# Patient Record
Sex: MALE | Race: WHITE | NOT HISPANIC OR LATINO | Employment: FULL TIME | URBAN - METROPOLITAN AREA
[De-identification: names, ages, dates, MRNs, and addresses within clinical notes are randomized per-mention and may not be internally consistent; named-entity substitution may affect disease eponyms.]

---

## 2018-07-17 ENCOUNTER — OFFICE VISIT (OUTPATIENT)
Dept: CARDIOLOGY | Facility: CLINIC | Age: 48
End: 2018-07-17

## 2018-07-17 VITALS
WEIGHT: 259.8 LBS | BODY MASS INDEX: 38.48 KG/M2 | HEIGHT: 69 IN | SYSTOLIC BLOOD PRESSURE: 130 MMHG | DIASTOLIC BLOOD PRESSURE: 80 MMHG | HEART RATE: 88 BPM

## 2018-07-17 DIAGNOSIS — R06.02 SHORTNESS OF BREATH: Primary | ICD-10-CM

## 2018-07-17 DIAGNOSIS — E11.9 TYPE 2 DIABETES MELLITUS WITHOUT COMPLICATION, WITHOUT LONG-TERM CURRENT USE OF INSULIN (HCC): ICD-10-CM

## 2018-07-17 DIAGNOSIS — R61 DIAPHORESIS: ICD-10-CM

## 2018-07-17 PROCEDURE — 93000 ELECTROCARDIOGRAM COMPLETE: CPT | Performed by: INTERNAL MEDICINE

## 2018-07-17 PROCEDURE — 99204 OFFICE O/P NEW MOD 45 MIN: CPT | Performed by: INTERNAL MEDICINE

## 2018-07-18 NOTE — PROGRESS NOTES
Date of Office Visit: 2018  Encounter Provider: Joel Dalton MD  Place of Service: [facility] CARDIOLOGY  Patient Name: Jose Almazan  :1970    Chief complaint: Shortness of breath, diaphoresis.    History of Present Illness:    Dear Dr. Alarcon:    I had the pleasure of seeing your patient in cardiology office on 2018. As you well   know, he is a very pleasant 48 year-old white male with a history of morbid obesity   and diabetes who presents for follow-up.  I originally saw the patient in  for   shortness of breath.  His work-up at that time showed no significant issues.  He   actually did undergo a cardiac catheterization which I do not have the results of   currently, although he had very mild nonobstructive disease per report.    The patient presents today for evaluation.  He states that he has been more   diaphoretic, even with minimal activity recently.  He also has been having more   shortness of breath with exertion, although he is unclear if this is from his asthma.    He is concerning given his long-standing diabetes, as well as his family history of   coronary artery disease.  His father had a four-vessel CABG in his late 40s, and his   mother also just recently is  from an MI, complicated by a perforated   coronary artery during a heart catheterization.  Paternal grandfather also had 3   myocardial infarctions.  He has not had any chest discomfort.  His EKG from today   is unremarkable.    Past Medical History:   Diagnosis Date   • Asthma    • Diabetes mellitus (CMS/HCC)    • GERD (gastroesophageal reflux disease)    • Hyperlipidemia    • Hypertension    • Kidney stones    • Morbid obesity (CMS/HCC)    • GURDEEP on CPAP        Past Surgical History:   Procedure Laterality Date   • CHOLECYSTECTOMY     • TONSILLECTOMY         Current Outpatient Prescriptions on File Prior to Visit   Medication Sig Dispense Refill   • aspirin 325 MG  tablet Take 325 mg by mouth.     • dapagliflozin (FARXIGA) 5 MG tablet tablet Take 5 mg by mouth.     • glimepiride (AMARYL) 4 MG tablet Take 4 mg by mouth.     • glucose blood test strip 1 strip by Other route.     • irbesartan (AVAPRO) 150 MG tablet Take  by mouth.     • Lancets Misc. misc Inject 1 each under the skin.     • Liraglutide (VICTOZA) 18 MG/3ML solution pen-injector injection Inject 1.8 mg under the skin.     • metFORMIN ER (GLUCOPHAGE-XR) 500 MG 24 hr tablet Take 1,500 mg by mouth.     • metroNIDAZOLE (METROGEL) 0.75 % gel Apply  topically.     • omeprazole (priLOSEC) 40 MG capsule Take 40 mg by mouth.     • pioglitazone-metFORMIN (ACTOPLUS MET)  MG per tablet      • ROSUVASTATIN CALCIUM PO Take 10 mg by mouth.       No current facility-administered medications on file prior to visit.      Allergies as of 2018   • (No Known Allergies)     Social History     Social History   • Marital status:      Spouse name: N/A   • Number of children: N/A   • Years of education: N/A     Occupational History   • Not on file.     Social History Main Topics   • Smoking status: Never Smoker   • Smokeless tobacco: Never Used   • Alcohol use Yes      Comment: Socially.  3-4 beers per week on average.   • Drug use: No   • Sexual activity: Not on file     Other Topics Concern   • Not on file     Social History Narrative   • No narrative on file     Family History   Problem Relation Age of Onset   • Heart disease Mother         Mother  from MI and perforated coronary artery during cath   • Heart disease Father         Father with 4 vessel CABG in his late 40's.  Also with 2 MI's.   • Diabetes Father    • Down syndrome Sister    • Coronary artery disease Paternal Grandfather         Paternal GF with 3 MI's       Review of Systems   Constitution: Positive for diaphoresis.   Cardiovascular: Positive for dyspnea on exertion.   All other systems reviewed and are negative.     Objective:     Vitals:     "07/17/18 0926   BP: 130/80   Pulse: 88   Weight: 118 kg (259 lb 12.8 oz)   Height: 175.3 cm (69\")     Body mass index is 38.37 kg/m².    Physical Exam   Constitutional: He is oriented to person, place, and time.   Obese   HENT:   Head: Normocephalic and atraumatic.   Eyes: Conjunctivae are normal.   Neck: Neck supple.   Cardiovascular: Normal rate and regular rhythm.  Exam reveals no gallop and no friction rub.    No murmur heard.  Pulmonary/Chest: Effort normal and breath sounds normal.   Abdominal: Soft. There is no tenderness.   Musculoskeletal: He exhibits no edema.   Neurological: He is alert and oriented to person, place, and time.   Skin: Skin is warm.   Psychiatric: He has a normal mood and affect. His behavior is normal.     Lab Review:     ECG 12 Lead  Date/Time: 7/17/2018 3:28 PM  Performed by: ORLY BOWDEN  Authorized by: ORLY BOWDEN   Comparison: compared with previous ECG from 8/28/2016  Rhythm: sinus rhythm  Rate: normal  BPM: 88  Clinical impression: normal ECG          Assessment:       Diagnosis Plan   1. Shortness of breath  Adult Stress Echo W/ Cont or Stress Agent if Necessary Per Protocol   2. Type 2 diabetes mellitus without complication, without long-term current use of insulin (CMS/[facility])  Adult Stress Echo W/ Cont or Stress Agent if Necessary Per Protocol   3. Diaphoresis  Adult Stress Echo W/ Cont or Stress Agent if Necessary Per Protocol     Plan:       The patient does have a history of long-standing diabetes, as well as an extensive family   history of coronary artery disease.  I do not have his heart catheterization from 2011 at   [facility], although there was reportedly mild nonobstructive disease at that time.    Obviously, thinks could have changed since that point.  He has been more short of breath   and diaphoretic with exertion.  I have recommended proceeding with an exercise stress   echocardiogram at this point to evaluate for any potential structural " disease, as well as   for potential ischemia.  He is already taking aspirin and Crestor, and I advised him that   these are the medications that will help prevent MI and death in the future if he were to   have any significant coronary disease.  Further plans will be made pending the results of   the stress echocardiogram.

## 2018-07-23 ENCOUNTER — APPOINTMENT (OUTPATIENT)
Dept: CARDIOLOGY | Facility: HOSPITAL | Age: 48
End: 2018-07-23
Attending: INTERNAL MEDICINE

## 2018-08-07 ENCOUNTER — HOSPITAL ENCOUNTER (OUTPATIENT)
Dept: CARDIOLOGY | Facility: HOSPITAL | Age: 48
Discharge: HOME | End: 2018-08-07
Attending: INTERNAL MEDICINE | Admitting: INTERNAL MEDICINE

## 2018-08-07 VITALS
DIASTOLIC BLOOD PRESSURE: 90 MMHG | SYSTOLIC BLOOD PRESSURE: 132 MMHG | WEIGHT: 259 LBS | HEIGHT: 69 IN | BODY MASS INDEX: 38.36 KG/M2 | HEART RATE: 93 BPM

## 2018-08-07 DIAGNOSIS — E11.9 TYPE 2 DIABETES MELLITUS WITHOUT COMPLICATION, WITHOUT LONG-TERM CURRENT USE OF INSULIN (HCC): ICD-10-CM

## 2018-08-07 DIAGNOSIS — R61 DIAPHORESIS: ICD-10-CM

## 2018-08-07 DIAGNOSIS — R06.02 SHORTNESS OF BREATH: ICD-10-CM

## 2018-08-07 LAB
BH CV ECHO MEAS - ACS: 2 CM
BH CV ECHO MEAS - AO MAX PG: 4.3 MMHG
BH CV ECHO MEAS - AO ROOT AREA (BSA CORRECTED): 1.4
BH CV ECHO MEAS - AO ROOT AREA: 7.9 CM^2
BH CV ECHO MEAS - AO ROOT DIAM: 3.2 CM
BH CV ECHO MEAS - AO V2 MAX: 103.9 CM/SEC
BH CV ECHO MEAS - BSA(HAYCOCK): 2.4 M^2
BH CV ECHO MEAS - BSA: 2.3 M^2
BH CV ECHO MEAS - BZI_BMI: 38.2 KILOGRAMS/M^2
BH CV ECHO MEAS - BZI_METRIC_HEIGHT: 175.3 CM
BH CV ECHO MEAS - BZI_METRIC_WEIGHT: 117.5 KG
BH CV ECHO MEAS - CONTRAST EF (2CH): 60 ML/M^2
BH CV ECHO MEAS - CONTRAST EF 4CH: 69.7 ML/M^2
BH CV ECHO MEAS - EDV(MOD-SP2): 85 ML
BH CV ECHO MEAS - EDV(MOD-SP4): 76 ML
BH CV ECHO MEAS - EDV(TEICH): 143.7 ML
BH CV ECHO MEAS - EF(CUBED): 68.8 %
BH CV ECHO MEAS - EF(MOD-BP): 61 %
BH CV ECHO MEAS - EF(MOD-SP2): 60 %
BH CV ECHO MEAS - EF(MOD-SP4): 69.7 %
BH CV ECHO MEAS - EF(TEICH): 59.9 %
BH CV ECHO MEAS - ESV(MOD-SP2): 34 ML
BH CV ECHO MEAS - ESV(MOD-SP4): 23 ML
BH CV ECHO MEAS - ESV(TEICH): 57.7 ML
BH CV ECHO MEAS - FS: 32.2 %
BH CV ECHO MEAS - IVS/LVPW: 1
BH CV ECHO MEAS - IVSD: 1.2 CM
BH CV ECHO MEAS - LAT PEAK E' VEL: 9 CM/SEC
BH CV ECHO MEAS - LV DIASTOLIC VOL/BSA (35-75): 33 ML/M^2
BH CV ECHO MEAS - LV MASS(C)D: 246.2 GRAMS
BH CV ECHO MEAS - LV MASS(C)DI: 106.8 GRAMS/M^2
BH CV ECHO MEAS - LV SYSTOLIC VOL/BSA (12-30): 10 ML/M^2
BH CV ECHO MEAS - LVIDD: 5.4 CM
BH CV ECHO MEAS - LVIDS: 3.7 CM
BH CV ECHO MEAS - LVLD AP2: 8.2 CM
BH CV ECHO MEAS - LVLD AP4: 7.5 CM
BH CV ECHO MEAS - LVLS AP2: 7.6 CM
BH CV ECHO MEAS - LVLS AP4: 5.8 CM
BH CV ECHO MEAS - LVPWD: 1.1 CM
BH CV ECHO MEAS - MED PEAK E' VEL: 6 CM/SEC
BH CV ECHO MEAS - MV A DUR: 0.11 SEC
BH CV ECHO MEAS - MV A MAX VEL: 64.8 CM/SEC
BH CV ECHO MEAS - MV DEC SLOPE: 185.8 CM/SEC^2
BH CV ECHO MEAS - MV DEC TIME: 0.23 SEC
BH CV ECHO MEAS - MV E MAX VEL: 45.6 CM/SEC
BH CV ECHO MEAS - MV E/A: 0.7
BH CV ECHO MEAS - MV P1/2T MAX VEL: 47 CM/SEC
BH CV ECHO MEAS - MV P1/2T: 74 MSEC
BH CV ECHO MEAS - MVA P1/2T LCG: 4.7 CM^2
BH CV ECHO MEAS - MVA(P1/2T): 3 CM^2
BH CV ECHO MEAS - PULM A REVS DUR: 0.11 SEC
BH CV ECHO MEAS - PULM A REVS VEL: 23 CM/SEC
BH CV ECHO MEAS - PULM DIAS VEL: 27.1 CM/SEC
BH CV ECHO MEAS - PULM S/D: 1.3
BH CV ECHO MEAS - PULM SYS VEL: 36.3 CM/SEC
BH CV ECHO MEAS - SI(CUBED): 48 ML/M^2
BH CV ECHO MEAS - SI(MOD-SP2): 22.1 ML/M^2
BH CV ECHO MEAS - SI(MOD-SP4): 23 ML/M^2
BH CV ECHO MEAS - SI(TEICH): 37.3 ML/M^2
BH CV ECHO MEAS - SV(CUBED): 110.8 ML
BH CV ECHO MEAS - SV(MOD-SP2): 51 ML
BH CV ECHO MEAS - SV(MOD-SP4): 53 ML
BH CV ECHO MEAS - SV(TEICH): 86 ML
BH CV ECHO MEAS - TAPSE (>1.6): 2.5 CM2
BH CV ECHO MEASUREMENTS AVERAGE E/E' RATIO: 6.08
BH CV STRESS BP STAGE 1: NORMAL
BH CV STRESS BP STAGE 2: NORMAL
BH CV STRESS BP STAGE 3: NORMAL
BH CV STRESS DURATION MIN STAGE 1: 3
BH CV STRESS DURATION MIN STAGE 2: 3
BH CV STRESS DURATION MIN STAGE 3: 2
BH CV STRESS DURATION SEC STAGE 1: 0
BH CV STRESS DURATION SEC STAGE 2: 0
BH CV STRESS DURATION SEC STAGE 3: 0
BH CV STRESS ECHO POST STRESS EJECTION FRACTION EF: 69 %
BH CV STRESS GRADE STAGE 1: 10
BH CV STRESS GRADE STAGE 2: 12
BH CV STRESS GRADE STAGE 3: 14
BH CV STRESS HR STAGE 2: 141
BH CV STRESS HR STAGE 3: 156
BH CV STRESS METS STAGE 1: 5
BH CV STRESS METS STAGE 2: 7.5
BH CV STRESS METS STAGE 3: 10
BH CV STRESS PROTOCOL 1: NORMAL
BH CV STRESS SPEED STAGE 1: 1.7
BH CV STRESS SPEED STAGE 2: 2.5
BH CV STRESS SPEED STAGE 3: 3.4
BH CV STRESS STAGE 1: 1
BH CV STRESS STAGE 2: 2
BH CV STRESS STAGE 3: 3
BH CV XLRA - RV BASE: 2.8 CM
BH CV XLRA - TDI S': 10 CM/SEC
LEFT ATRIUM VOLUME INDEX: 39 ML/M2
LV EF 2D ECHO EST: 61 %
PERCENT MAX PREDICTED HR: 91.23 %
STRESS BASELINE BP: NORMAL MMHG
STRESS BASELINE HR: 93 BPM
STRESS PERCENT HR: 107 %
STRESS POST ESTIMATED WORKLOAD: 9 METS
STRESS POST EXERCISE DUR MIN: 8 MIN
STRESS POST EXERCISE DUR SEC: 0 SEC
STRESS POST PEAK BP: NORMAL MMHG
STRESS POST PEAK HR: 156 BPM

## 2018-08-07 PROCEDURE — 93320 DOPPLER ECHO COMPLETE: CPT | Performed by: INTERNAL MEDICINE

## 2018-08-07 PROCEDURE — 93325 DOPPLER ECHO COLOR FLOW MAPG: CPT | Performed by: INTERNAL MEDICINE

## 2018-08-07 PROCEDURE — 93018 CV STRESS TEST I&R ONLY: CPT | Performed by: INTERNAL MEDICINE

## 2018-08-07 PROCEDURE — 93016 CV STRESS TEST SUPVJ ONLY: CPT | Performed by: INTERNAL MEDICINE

## 2018-08-07 PROCEDURE — 93325 DOPPLER ECHO COLOR FLOW MAPG: CPT

## 2018-08-07 PROCEDURE — 93350 STRESS TTE ONLY: CPT

## 2018-08-07 PROCEDURE — 93350 STRESS TTE ONLY: CPT | Performed by: INTERNAL MEDICINE

## 2018-08-07 PROCEDURE — 93017 CV STRESS TEST TRACING ONLY: CPT

## 2018-08-07 PROCEDURE — 93320 DOPPLER ECHO COMPLETE: CPT

## 2019-03-17 ENCOUNTER — HOSPITAL ENCOUNTER (OUTPATIENT)
Facility: HOSPITAL | Age: 49
Discharge: HOME | End: 2019-03-18
Attending: EMERGENCY MEDICINE | Admitting: INTERNAL MEDICINE

## 2019-03-17 ENCOUNTER — APPOINTMENT (OUTPATIENT)
Dept: GENERAL RADIOLOGY | Facility: HOSPITAL | Age: 49
End: 2019-03-17

## 2019-03-17 DIAGNOSIS — R07.9 CHEST PAIN, UNSPECIFIED TYPE: Primary | ICD-10-CM

## 2019-03-17 LAB
ALBUMIN SERPL-MCNC: 4.4 G/DL (ref 3.5–5.2)
ALBUMIN/GLOB SERPL: 1.8 G/DL
ALP SERPL-CCNC: 88 U/L (ref 39–117)
ALT SERPL W P-5'-P-CCNC: 24 U/L (ref 1–41)
ANION GAP SERPL CALCULATED.3IONS-SCNC: 14.7 MMOL/L
AST SERPL-CCNC: 18 U/L (ref 1–40)
BASOPHILS # BLD AUTO: 0.04 10*3/MM3 (ref 0–0.2)
BASOPHILS NFR BLD AUTO: 0.5 % (ref 0–1.5)
BILIRUB SERPL-MCNC: 0.8 MG/DL (ref 0.1–1.2)
BUN BLD-MCNC: 13 MG/DL (ref 6–20)
BUN/CREAT SERPL: 13.7 (ref 7–25)
CALCIUM SPEC-SCNC: 9.4 MG/DL (ref 8.6–10.5)
CHLORIDE SERPL-SCNC: 102 MMOL/L (ref 98–107)
CO2 SERPL-SCNC: 26.3 MMOL/L (ref 22–29)
CREAT BLD-MCNC: 0.95 MG/DL (ref 0.76–1.27)
D DIMER PPP FEU-MCNC: <0.27 MCGFEU/ML (ref 0–0.49)
DEPRECATED RDW RBC AUTO: 39.5 FL (ref 37–54)
EOSINOPHIL # BLD AUTO: 0.07 10*3/MM3 (ref 0–0.4)
EOSINOPHIL NFR BLD AUTO: 0.9 % (ref 0.3–6.2)
ERYTHROCYTE [DISTWIDTH] IN BLOOD BY AUTOMATED COUNT: 11.9 % (ref 12.3–15.4)
GFR SERPL CREATININE-BSD FRML MDRD: 84 ML/MIN/1.73
GLOBULIN UR ELPH-MCNC: 2.5 GM/DL
GLUCOSE BLD-MCNC: 89 MG/DL (ref 65–99)
GLUCOSE BLDC GLUCOMTR-MCNC: 75 MG/DL (ref 70–130)
HCT VFR BLD AUTO: 43.7 % (ref 37.5–51)
HGB BLD-MCNC: 14.5 G/DL (ref 13–17.7)
HOLD SPECIMEN: NORMAL
HOLD SPECIMEN: NORMAL
IMM GRANULOCYTES # BLD AUTO: 0.03 10*3/MM3 (ref 0–0.05)
IMM GRANULOCYTES NFR BLD AUTO: 0.4 % (ref 0–0.5)
LYMPHOCYTES # BLD AUTO: 0.85 10*3/MM3 (ref 0.7–3.1)
LYMPHOCYTES NFR BLD AUTO: 11.3 % (ref 19.6–45.3)
MCH RBC QN AUTO: 30.1 PG (ref 26.6–33)
MCHC RBC AUTO-ENTMCNC: 33.2 G/DL (ref 31.5–35.7)
MCV RBC AUTO: 90.7 FL (ref 79–97)
MONOCYTES # BLD AUTO: 0.57 10*3/MM3 (ref 0.1–0.9)
MONOCYTES NFR BLD AUTO: 7.6 % (ref 5–12)
NEUTROPHILS # BLD AUTO: 5.93 10*3/MM3 (ref 1.4–7)
NEUTROPHILS NFR BLD AUTO: 79.3 % (ref 42.7–76)
NRBC BLD AUTO-RTO: 0 /100 WBC (ref 0–0)
PLATELET # BLD AUTO: 236 10*3/MM3 (ref 140–450)
PMV BLD AUTO: 9.7 FL (ref 6–12)
POTASSIUM BLD-SCNC: 3.8 MMOL/L (ref 3.5–5.2)
PROT SERPL-MCNC: 6.9 G/DL (ref 6–8.5)
RBC # BLD AUTO: 4.82 10*6/MM3 (ref 4.14–5.8)
SODIUM BLD-SCNC: 143 MMOL/L (ref 136–145)
TROPONIN T SERPL-MCNC: <0.01 NG/ML (ref 0–0.03)
TROPONIN T SERPL-MCNC: <0.01 NG/ML (ref 0–0.03)
WBC NRBC COR # BLD: 7.49 10*3/MM3 (ref 3.4–10.8)
WHOLE BLOOD HOLD SPECIMEN: NORMAL
WHOLE BLOOD HOLD SPECIMEN: NORMAL

## 2019-03-17 PROCEDURE — 25010000002 ENOXAPARIN PER 10 MG: Performed by: INTERNAL MEDICINE

## 2019-03-17 PROCEDURE — 93010 ELECTROCARDIOGRAM REPORT: CPT | Performed by: INTERNAL MEDICINE

## 2019-03-17 PROCEDURE — G0378 HOSPITAL OBSERVATION PER HR: HCPCS

## 2019-03-17 PROCEDURE — 84484 ASSAY OF TROPONIN QUANT: CPT | Performed by: INTERNAL MEDICINE

## 2019-03-17 PROCEDURE — 84484 ASSAY OF TROPONIN QUANT: CPT | Performed by: PHYSICIAN ASSISTANT

## 2019-03-17 PROCEDURE — 71045 X-RAY EXAM CHEST 1 VIEW: CPT

## 2019-03-17 PROCEDURE — 80053 COMPREHEN METABOLIC PANEL: CPT | Performed by: PHYSICIAN ASSISTANT

## 2019-03-17 PROCEDURE — 93005 ELECTROCARDIOGRAM TRACING: CPT

## 2019-03-17 PROCEDURE — 96372 THER/PROPH/DIAG INJ SC/IM: CPT

## 2019-03-17 PROCEDURE — 99284 EMERGENCY DEPT VISIT MOD MDM: CPT

## 2019-03-17 PROCEDURE — 85379 FIBRIN DEGRADATION QUANT: CPT | Performed by: PHYSICIAN ASSISTANT

## 2019-03-17 PROCEDURE — 82962 GLUCOSE BLOOD TEST: CPT

## 2019-03-17 PROCEDURE — 93005 ELECTROCARDIOGRAM TRACING: CPT | Performed by: EMERGENCY MEDICINE

## 2019-03-17 PROCEDURE — 85025 COMPLETE CBC W/AUTO DIFF WBC: CPT | Performed by: PHYSICIAN ASSISTANT

## 2019-03-17 RX ORDER — SODIUM CHLORIDE 0.9 % (FLUSH) 0.9 %
3 SYRINGE (ML) INJECTION EVERY 12 HOURS SCHEDULED
Status: DISCONTINUED | OUTPATIENT
Start: 2019-03-17 | End: 2019-03-18 | Stop reason: HOSPADM

## 2019-03-17 RX ORDER — SODIUM CHLORIDE 0.9 % (FLUSH) 0.9 %
10 SYRINGE (ML) INJECTION AS NEEDED
Status: DISCONTINUED | OUTPATIENT
Start: 2019-03-17 | End: 2019-03-18 | Stop reason: HOSPADM

## 2019-03-17 RX ORDER — GLIPIZIDE 10 MG/1
10 TABLET ORAL
Status: DISCONTINUED | OUTPATIENT
Start: 2019-03-18 | End: 2019-03-18 | Stop reason: HOSPADM

## 2019-03-17 RX ORDER — SODIUM CHLORIDE 0.9 % (FLUSH) 0.9 %
3-10 SYRINGE (ML) INJECTION AS NEEDED
Status: DISCONTINUED | OUTPATIENT
Start: 2019-03-17 | End: 2019-03-18 | Stop reason: HOSPADM

## 2019-03-17 RX ORDER — ATORVASTATIN CALCIUM 10 MG/1
10 TABLET, FILM COATED ORAL NIGHTLY
Status: DISCONTINUED | OUTPATIENT
Start: 2019-03-17 | End: 2019-03-18

## 2019-03-17 RX ORDER — ZOLPIDEM TARTRATE 5 MG/1
5 TABLET ORAL NIGHTLY PRN
Status: DISCONTINUED | OUTPATIENT
Start: 2019-03-17 | End: 2019-03-18 | Stop reason: HOSPADM

## 2019-03-17 RX ORDER — LOSARTAN POTASSIUM 50 MG/1
50 TABLET ORAL
Status: DISCONTINUED | OUTPATIENT
Start: 2019-03-18 | End: 2019-03-18

## 2019-03-17 RX ADMIN — ZOLPIDEM TARTRATE 5 MG: 5 TABLET ORAL at 23:08

## 2019-03-17 RX ADMIN — ENOXAPARIN SODIUM 40 MG: 40 INJECTION SUBCUTANEOUS at 22:32

## 2019-03-17 RX ADMIN — Medication 3 ML: at 21:19

## 2019-03-18 ENCOUNTER — APPOINTMENT (OUTPATIENT)
Dept: NUCLEAR MEDICINE | Facility: HOSPITAL | Age: 49
End: 2019-03-18

## 2019-03-18 VITALS
RESPIRATION RATE: 16 BRPM | DIASTOLIC BLOOD PRESSURE: 92 MMHG | OXYGEN SATURATION: 95 % | SYSTOLIC BLOOD PRESSURE: 127 MMHG | HEIGHT: 69 IN | WEIGHT: 250.7 LBS | TEMPERATURE: 98.5 F | HEART RATE: 80 BPM | BODY MASS INDEX: 37.13 KG/M2

## 2019-03-18 PROBLEM — E66.9 OBESITY: Status: ACTIVE | Noted: 2018-03-27

## 2019-03-18 PROBLEM — I10 PRIMARY HYPERTENSION: Status: ACTIVE | Noted: 2018-03-27

## 2019-03-18 PROBLEM — K21.9 GERD (GASTROESOPHAGEAL REFLUX DISEASE): Status: ACTIVE | Noted: 2018-03-27

## 2019-03-18 PROBLEM — E78.00 HYPERCHOLESTEROLEMIA: Status: ACTIVE | Noted: 2018-03-27

## 2019-03-18 PROBLEM — E66.01 MORBID OBESITY (HCC): Status: ACTIVE | Noted: 2018-03-27

## 2019-03-18 PROBLEM — E11.69 DIABETES MELLITUS TYPE 2 IN OBESE (HCC): Status: ACTIVE | Noted: 2018-03-27

## 2019-03-18 PROBLEM — K43.0 RECURRENT INCISIONAL HERNIA WITH INCARCERATION: Status: ACTIVE | Noted: 2018-12-05

## 2019-03-18 PROBLEM — L71.9 ROSACEA: Status: ACTIVE | Noted: 2018-03-27

## 2019-03-18 PROBLEM — J45.909 ASTHMA: Status: ACTIVE | Noted: 2018-03-27

## 2019-03-18 PROBLEM — K61.1 PERIRECTAL ABSCESS: Status: ACTIVE | Noted: 2018-07-22

## 2019-03-18 PROBLEM — E66.9 DIABETES MELLITUS TYPE 2 IN OBESE (HCC): Status: ACTIVE | Noted: 2018-03-27

## 2019-03-18 LAB
GLUCOSE BLDC GLUCOMTR-MCNC: 101 MG/DL (ref 70–130)
GLUCOSE BLDC GLUCOMTR-MCNC: 112 MG/DL (ref 70–130)
GLUCOSE BLDC GLUCOMTR-MCNC: 114 MG/DL (ref 70–130)
GLUCOSE BLDC GLUCOMTR-MCNC: 152 MG/DL (ref 70–130)
HBA1C MFR BLD: 8 % (ref 4.8–5.6)
TROPONIN T SERPL-MCNC: <0.01 NG/ML (ref 0–0.03)

## 2019-03-18 PROCEDURE — 83036 HEMOGLOBIN GLYCOSYLATED A1C: CPT | Performed by: NURSE PRACTITIONER

## 2019-03-18 PROCEDURE — 93010 ELECTROCARDIOGRAM REPORT: CPT | Performed by: INTERNAL MEDICINE

## 2019-03-18 PROCEDURE — 25010000002 FENTANYL CITRATE (PF) 100 MCG/2ML SOLUTION: Performed by: INTERNAL MEDICINE

## 2019-03-18 PROCEDURE — 93454 CORONARY ARTERY ANGIO S&I: CPT | Performed by: INTERNAL MEDICINE

## 2019-03-18 PROCEDURE — 25010000002 HEPARIN (PORCINE) PER 1000 UNITS: Performed by: INTERNAL MEDICINE

## 2019-03-18 PROCEDURE — 93005 ELECTROCARDIOGRAM TRACING: CPT | Performed by: INTERNAL MEDICINE

## 2019-03-18 PROCEDURE — C1769 GUIDE WIRE: HCPCS | Performed by: INTERNAL MEDICINE

## 2019-03-18 PROCEDURE — 84484 ASSAY OF TROPONIN QUANT: CPT | Performed by: INTERNAL MEDICINE

## 2019-03-18 PROCEDURE — 82962 GLUCOSE BLOOD TEST: CPT

## 2019-03-18 PROCEDURE — 0 IOPAMIDOL PER 1 ML: Performed by: INTERNAL MEDICINE

## 2019-03-18 PROCEDURE — 25010000002 MIDAZOLAM PER 1 MG: Performed by: INTERNAL MEDICINE

## 2019-03-18 PROCEDURE — C1894 INTRO/SHEATH, NON-LASER: HCPCS | Performed by: INTERNAL MEDICINE

## 2019-03-18 PROCEDURE — G0378 HOSPITAL OBSERVATION PER HR: HCPCS

## 2019-03-18 RX ORDER — MIDAZOLAM HYDROCHLORIDE 1 MG/ML
INJECTION INTRAMUSCULAR; INTRAVENOUS AS NEEDED
Status: DISCONTINUED | OUTPATIENT
Start: 2019-03-18 | End: 2019-03-18 | Stop reason: HOSPADM

## 2019-03-18 RX ORDER — FENTANYL CITRATE 50 UG/ML
INJECTION, SOLUTION INTRAMUSCULAR; INTRAVENOUS AS NEEDED
Status: DISCONTINUED | OUTPATIENT
Start: 2019-03-18 | End: 2019-03-18 | Stop reason: HOSPADM

## 2019-03-18 RX ORDER — SODIUM CHLORIDE 0.9 % (FLUSH) 0.9 %
3-10 SYRINGE (ML) INJECTION AS NEEDED
Status: DISCONTINUED | OUTPATIENT
Start: 2019-03-18 | End: 2019-03-18 | Stop reason: HOSPADM

## 2019-03-18 RX ORDER — LOSARTAN POTASSIUM 100 MG/1
100 TABLET ORAL
Status: DISCONTINUED | OUTPATIENT
Start: 2019-03-18 | End: 2019-03-18 | Stop reason: HOSPADM

## 2019-03-18 RX ORDER — NICOTINE POLACRILEX 4 MG
15 LOZENGE BUCCAL
Status: DISCONTINUED | OUTPATIENT
Start: 2019-03-18 | End: 2019-03-18 | Stop reason: HOSPADM

## 2019-03-18 RX ORDER — ROSUVASTATIN CALCIUM 10 MG/1
10 TABLET, COATED ORAL NIGHTLY
Status: DISCONTINUED | OUTPATIENT
Start: 2019-03-18 | End: 2019-03-18 | Stop reason: HOSPADM

## 2019-03-18 RX ORDER — SODIUM CHLORIDE 9 MG/ML
75 INJECTION, SOLUTION INTRAVENOUS CONTINUOUS
Status: DISCONTINUED | OUTPATIENT
Start: 2019-03-18 | End: 2019-03-18 | Stop reason: HOSPADM

## 2019-03-18 RX ORDER — LIDOCAINE HYDROCHLORIDE 20 MG/ML
INJECTION, SOLUTION INFILTRATION; PERINEURAL AS NEEDED
Status: DISCONTINUED | OUTPATIENT
Start: 2019-03-18 | End: 2019-03-18 | Stop reason: HOSPADM

## 2019-03-18 RX ORDER — INSULIN GLARGINE 100 [IU]/ML
10 INJECTION, SOLUTION SUBCUTANEOUS NIGHTLY
Status: DISCONTINUED | OUTPATIENT
Start: 2019-03-18 | End: 2019-03-18

## 2019-03-18 RX ORDER — SODIUM CHLORIDE 0.9 % (FLUSH) 0.9 %
3 SYRINGE (ML) INJECTION EVERY 12 HOURS SCHEDULED
Status: DISCONTINUED | OUTPATIENT
Start: 2019-03-18 | End: 2019-03-18 | Stop reason: HOSPADM

## 2019-03-18 RX ORDER — LOSARTAN POTASSIUM 100 MG/1
100 TABLET ORAL
Status: DISCONTINUED | OUTPATIENT
Start: 2019-03-19 | End: 2019-03-18

## 2019-03-18 RX ORDER — DEXTROSE MONOHYDRATE 25 G/50ML
25 INJECTION, SOLUTION INTRAVENOUS
Status: DISCONTINUED | OUTPATIENT
Start: 2019-03-18 | End: 2019-03-18 | Stop reason: HOSPADM

## 2019-03-18 RX ADMIN — SODIUM CHLORIDE 75 ML/HR: 9 INJECTION, SOLUTION INTRAVENOUS at 12:51

## 2019-03-18 RX ADMIN — Medication 3 ML: at 08:26

## 2019-03-18 RX ADMIN — LOSARTAN POTASSIUM 100 MG: 100 TABLET, FILM COATED ORAL at 12:51

## 2020-03-16 ENCOUNTER — OFFICE VISIT (OUTPATIENT)
Dept: SURGERY | Facility: CLINIC | Age: 50
End: 2020-03-16

## 2020-03-16 VITALS — HEIGHT: 69 IN | OXYGEN SATURATION: 98 % | BODY MASS INDEX: 39.1 KG/M2 | WEIGHT: 264 LBS | HEART RATE: 87 BPM

## 2020-03-16 DIAGNOSIS — K43.2 INCISIONAL HERNIA, WITHOUT OBSTRUCTION OR GANGRENE: Primary | ICD-10-CM

## 2020-03-16 PROCEDURE — 99204 OFFICE O/P NEW MOD 45 MIN: CPT | Performed by: SURGERY

## 2020-03-16 RX ORDER — CELECOXIB 200 MG/1
200 CAPSULE ORAL DAILY
COMMUNITY
Start: 2020-02-25 | End: 2022-06-03 | Stop reason: HOSPADM

## 2020-03-16 NOTE — PROGRESS NOTES
SUMMARY (A/P):    50-year-old gentleman with recurrent incisional hernia (this is been repaired on 3 occasions at this point).  He has a BMI of 39; associated hypertension, hyperlipidemia, diabetes and sleep apnea; and his diabetes is poorly controlled based on recent A1c measurement.  I discussed with him the risk of the hernia including incarceration and strangulation resulting in emergency surgery.  I also, however, emphasized to him that this is his third recurrence and would be his fourth surgery to repair this hernia and to do so without addressing his BMI of 39 and resultant comorbidities would come with extremely high risk of surgical complications as well as recurrence of his hernia.  I would recommend when the time comes open repair with mesh.  I would recommend against surgical intervention until he can get his weight to approximately 200 pounds and I offered resources to help with this.      CC:    Incisional hernia, referred to see me by a friend of his    HPI:    50-year-old gentleman presents with 2-month history of mild to moderate intermittent epigastric abdominal pain associated with bulge and constipation.  No nausea or vomiting.    PSH:    • Laparoscopic umbilical hernia repair 2016  • Laparoscopic/robotic incisional hernia repair with removal of what was said to be infected mesh 2018  • Laparoscopic/robotic incisional hernia repair 2019  • Colonoscopy 3 years ago    PMH:    • Diabetes  • Asthma  • GERD  • Hyperlipidemia  • Hypertension  • Morbid obesity  • Sleep apnea, uses CPAP    FAMILY HISTORY:    • Negative for colorectal cancer    SOCIAL HISTORY:   • Denies tobacco use  • Occasional alcohol use    ALLERGIES: None    MEDICATIONS: reviewed, in Epic.  On:  · Aspirin 325 mg  · Celebrex  · Dapagliflozin  · Glimepiride  · Insulin  · Victoza  · Losartan  · Metformin  · Omeprazole  · Rosuvastatin    ROS:  No chest pain or shortness of air.  All other systems reviewed and negative other than  presenting complaints.    RADIOLOGY/ENDOSCOPY:    • CT abdomen pelvis 2/26/2020 [facility] imaging: Umbilical/supraumbilical ventral abdominal wall hernia containing fat and multiple nonobstructed loops of small bowel.  I reviewed the images and concur.    LABS:    • Hemoglobin A1c 3/4/2020 8.1  • CMP 3/4/2020: Glucose 117, otherwise normal    PHYSICAL EXAM:   • Constitutional: Well-developed well-nourished, no acute distress  • Vital signs: HR 87, weight 264 pounds, height 69 inches, BMI 39  Discussed with patient increased perioperative risks associated with obesity including increased risks of DVT, infection, seromas, poor wound healing and hernias (with abdominal surgery).  • Eyes: Conjunctiva normal, sclera nonicteric  • ENMT: Hearing grossly normal, oral mucosa moist  • Neck: Supple, no palpable mass, trachea midline  • Respiratory: Clear to auscultation, normal inspiratory effort  • Cardiovascular: Regular rate, no murmur, no carotid bruit, no peripheral edema, no jugular venous distention  • Gastrointestinal: Soft, nontender, no palpable mass, no hepatosplenomegaly, palpable bulge in the supraumbilical region at least partially reducible and not particularly tender  • Lymphatics (palpable nodes):  cervical-negative, axillary-negative  • Skin:  Warm, dry, no rash on visualized skin surfaces  • Musculoskeletal: Symmetric strength, normal gait  • Psychiatric: Alert and oriented ×3, normal affect     SRUTHI MENDOZA M.D.

## 2020-12-16 ENCOUNTER — HOSPITAL ENCOUNTER (EMERGENCY)
Facility: HOSPITAL | Age: 50
Discharge: HOME | End: 2020-12-16
Attending: EMERGENCY MEDICINE | Admitting: EMERGENCY MEDICINE

## 2020-12-16 ENCOUNTER — APPOINTMENT (OUTPATIENT)
Dept: GENERAL RADIOLOGY | Facility: HOSPITAL | Age: 50
End: 2020-12-16

## 2020-12-16 VITALS
BODY MASS INDEX: 38.51 KG/M2 | RESPIRATION RATE: 14 BRPM | TEMPERATURE: 97.5 F | SYSTOLIC BLOOD PRESSURE: 162 MMHG | WEIGHT: 260 LBS | HEART RATE: 77 BPM | DIASTOLIC BLOOD PRESSURE: 89 MMHG | HEIGHT: 69 IN | OXYGEN SATURATION: 96 %

## 2020-12-16 DIAGNOSIS — R07.9 CHEST PAIN, UNSPECIFIED TYPE: Primary | ICD-10-CM

## 2020-12-16 LAB
ALBUMIN SERPL-MCNC: 4.4 G/DL (ref 3.5–5.2)
ALBUMIN/GLOB SERPL: 1.9 G/DL
ALP SERPL-CCNC: 71 U/L (ref 39–117)
ALT SERPL W P-5'-P-CCNC: 23 U/L (ref 1–41)
ANION GAP SERPL CALCULATED.3IONS-SCNC: 12.9 MMOL/L (ref 5–15)
AST SERPL-CCNC: 21 U/L (ref 1–40)
BASOPHILS # BLD AUTO: 0.03 10*3/MM3 (ref 0–0.2)
BASOPHILS NFR BLD AUTO: 0.4 % (ref 0–1.5)
BILIRUB SERPL-MCNC: 0.7 MG/DL (ref 0–1.2)
BUN SERPL-MCNC: 14 MG/DL (ref 6–20)
BUN/CREAT SERPL: 13.5 (ref 7–25)
CALCIUM SPEC-SCNC: 9.1 MG/DL (ref 8.6–10.5)
CHLORIDE SERPL-SCNC: 101 MMOL/L (ref 98–107)
CO2 SERPL-SCNC: 26.1 MMOL/L (ref 22–29)
CREAT SERPL-MCNC: 1.04 MG/DL (ref 0.76–1.27)
DEPRECATED RDW RBC AUTO: 42.9 FL (ref 37–54)
EOSINOPHIL # BLD AUTO: 0.15 10*3/MM3 (ref 0–0.4)
EOSINOPHIL NFR BLD AUTO: 1.8 % (ref 0.3–6.2)
ERYTHROCYTE [DISTWIDTH] IN BLOOD BY AUTOMATED COUNT: 12.5 % (ref 12.3–15.4)
GFR SERPL CREATININE-BSD FRML MDRD: 75 ML/MIN/1.73
GLOBULIN UR ELPH-MCNC: 2.3 GM/DL
GLUCOSE SERPL-MCNC: 105 MG/DL (ref 65–99)
HCT VFR BLD AUTO: 42.3 % (ref 37.5–51)
HGB BLD-MCNC: 13.9 G/DL (ref 13–17.7)
HOLD SPECIMEN: NORMAL
HOLD SPECIMEN: NORMAL
IMM GRANULOCYTES # BLD AUTO: 0.03 10*3/MM3 (ref 0–0.05)
IMM GRANULOCYTES NFR BLD AUTO: 0.4 % (ref 0–0.5)
LYMPHOCYTES # BLD AUTO: 0.81 10*3/MM3 (ref 0.7–3.1)
LYMPHOCYTES NFR BLD AUTO: 9.5 % (ref 19.6–45.3)
MCH RBC QN AUTO: 30.9 PG (ref 26.6–33)
MCHC RBC AUTO-ENTMCNC: 32.9 G/DL (ref 31.5–35.7)
MCV RBC AUTO: 94 FL (ref 79–97)
MONOCYTES # BLD AUTO: 0.57 10*3/MM3 (ref 0.1–0.9)
MONOCYTES NFR BLD AUTO: 6.7 % (ref 5–12)
NEUTROPHILS NFR BLD AUTO: 6.97 10*3/MM3 (ref 1.7–7)
NEUTROPHILS NFR BLD AUTO: 81.2 % (ref 42.7–76)
NRBC BLD AUTO-RTO: 0 /100 WBC (ref 0–0.2)
PLATELET # BLD AUTO: 206 10*3/MM3 (ref 140–450)
PMV BLD AUTO: 9.2 FL (ref 6–12)
POTASSIUM SERPL-SCNC: 4.3 MMOL/L (ref 3.5–5.2)
PROT SERPL-MCNC: 6.7 G/DL (ref 6–8.5)
QT INTERVAL: 352 MS
RBC # BLD AUTO: 4.5 10*6/MM3 (ref 4.14–5.8)
SODIUM SERPL-SCNC: 140 MMOL/L (ref 136–145)
TROPONIN T SERPL-MCNC: <0.01 NG/ML (ref 0–0.03)
TROPONIN T SERPL-MCNC: <0.01 NG/ML (ref 0–0.03)
WBC # BLD AUTO: 8.56 10*3/MM3 (ref 3.4–10.8)
WHOLE BLOOD HOLD SPECIMEN: NORMAL
WHOLE BLOOD HOLD SPECIMEN: NORMAL

## 2020-12-16 PROCEDURE — 84484 ASSAY OF TROPONIN QUANT: CPT | Performed by: EMERGENCY MEDICINE

## 2020-12-16 PROCEDURE — 84484 ASSAY OF TROPONIN QUANT: CPT

## 2020-12-16 PROCEDURE — 99283 EMERGENCY DEPT VISIT LOW MDM: CPT

## 2020-12-16 PROCEDURE — 85025 COMPLETE CBC W/AUTO DIFF WBC: CPT

## 2020-12-16 PROCEDURE — 71046 X-RAY EXAM CHEST 2 VIEWS: CPT

## 2020-12-16 PROCEDURE — 93005 ELECTROCARDIOGRAM TRACING: CPT | Performed by: EMERGENCY MEDICINE

## 2020-12-16 PROCEDURE — 93005 ELECTROCARDIOGRAM TRACING: CPT

## 2020-12-16 PROCEDURE — 80053 COMPREHEN METABOLIC PANEL: CPT

## 2020-12-16 PROCEDURE — 93010 ELECTROCARDIOGRAM REPORT: CPT | Performed by: INTERNAL MEDICINE

## 2020-12-16 RX ORDER — SODIUM CHLORIDE 0.9 % (FLUSH) 0.9 %
10 SYRINGE (ML) INJECTION AS NEEDED
Status: DISCONTINUED | OUTPATIENT
Start: 2020-12-16 | End: 2020-12-16 | Stop reason: HOSPADM

## 2020-12-16 RX ORDER — ASPIRIN 325 MG
325 TABLET ORAL ONCE
Status: DISCONTINUED | OUTPATIENT
Start: 2020-12-16 | End: 2020-12-16 | Stop reason: HOSPADM

## 2020-12-16 NOTE — ED TRIAGE NOTES
Pt comes to ER from home for CP while sitting at home. Pt states he's been having right arm pain all day then suddenly had midsternal CP start about 45 min ago. Pt got 324mg ASA, 1 nitro and 100ml of fluid in route with EMS, didnt not alter pain. Pt and RN wearing mask upon triage.

## 2020-12-17 NOTE — ED PROVIDER NOTES
EMERGENCY DEPARTMENT ENCOUNTER    Room Number:  42/42  Date of encounter:  12/16/2020  PCP: Real Alarcon MD  Historian: Patient     I used full protective equipment while examining this patient.  This includes face mask, gloves and protective eyewear.  I washed my hands before entering the room and immediately upon leaving the room      HPI:  Chief Complaint: Chest pain  A complete HPI/ROS/PMH/PSH/SH/FH are unobtainable due to: None    Context: Jose Almazan is a 50 y.o. male who presents to the ED c/o chest pain.  Patient was walking using mild exertion around 4:00 when he developed aching discomfort in the mid chest.  Pain was 3 out of 10 intensity.  He called EMS who gave him aspirin and nitroglycerin.  Sometime thereafter pain resolved completely and is now completely gone.  Patient does report some tingling numbness to the right arm which is been ongoing for about 1 week and he feels is related to neck problems as he does have a history of known neck arthritis.  Patient did have heart cath less than 2 years ago that reported normal coronaries.  Patient is a non-smoker.  He does have a history of hypertension, hypercholesterolemia and diabetes.      PAST MEDICAL HISTORY  Active Ambulatory Problems     Diagnosis Date Noted   • GURDEEP (obstructive sleep apnea)    • Chest pain 03/17/2019   • Asthma 03/27/2018   • Diabetes mellitus type 2 in obese (CMS/HCC) 03/27/2018   • GERD (gastroesophageal reflux disease) 03/27/2018   • Hypercholesterolemia 03/27/2018   • Obesity 03/27/2018   • Perirectal abscess 07/22/2018   • Primary hypertension 03/27/2018   • Recurrent incisional hernia with incarceration 12/05/2018   • Rosacea 03/27/2018     Resolved Ambulatory Problems     Diagnosis Date Noted   • No Resolved Ambulatory Problems     Past Medical History:   Diagnosis Date   • Diabetes mellitus (CMS/HCC)    • Hyperlipidemia    • Hypertension    • Kidney stones    • Morbid obesity (CMS/HCC)    • GUDREEP on CPAP          PAST  SURGICAL HISTORY  Past Surgical History:   Procedure Laterality Date   • ABSCESS DRAINAGE N/A     rectal abscess   • CARDIAC CATHETERIZATION N/A 3/18/2019    Procedure: Left Heart Cath;  Surgeon: Nikolai Bolanos MD;  Location: [facility] CATH INVASIVE LOC[facility];  Service: Cardiology   • CHOLECYSTECTOMY     • HERNIA MESH REMOVAL  2018    removal of infected mesh & hernia repair   • INCISIONAL HERNIA REPAIR  2016    LAPAROSCOPIC REPAIR OF INCARCERATED INCISIONAL HERNIA CAUSING OBSTRUCTION WITH DUALMESH PLUS   • INCISIONAL HERNIA REPAIR N/A 2019   • KIDNEY STONE SURGERY     • LASIK     • TONSILLECTOMY           FAMILY HISTORY  Family History   Problem Relation Age of Onset   • Heart disease Mother         Mother  from MI and perforated coronary artery during cath   • Heart disease Father         Father with 4 vessel CABG in his late 40's.  Also with 2 MI's.   • Diabetes Father    • Down syndrome Sister    • Heart disease Sister    • Coronary artery disease Paternal Grandfather         Paternal GF with 3 MI's         SOCIAL HISTORY  Social History     Socioeconomic History   • Marital status:      Spouse name: Not on file   • Number of children: Not on file   • Years of education: Not on file   • Highest education level: Not on file   Tobacco Use   • Smoking status: Never Smoker   • Smokeless tobacco: Never Used   Substance and Sexual Activity   • Alcohol use: Yes     Comment: Socially.  3-4 beers per week on average.   • Drug use: No   • Sexual activity: Defer         ALLERGIES  Patient has no known allergies.       REVIEW OF SYSTEMS  Review of Systems   Constitutional: Negative.  Negative for fever.   HENT: Negative.  Negative for sore throat.    Eyes: Negative.    Respiratory: Negative.  Negative for cough.    Cardiovascular: Positive for chest pain.   Gastrointestinal: Negative.    Genitourinary: Negative.  Negative for dysuria.   Musculoskeletal: Positive for neck pain (Chronic,  unchanged from baseline). Negative for back pain.   Skin: Negative.  Negative for rash.   Neurological: Negative.  Negative for headaches.        Right arm tingling fairly constant over 1 week.  Patient feels this is likely related to neck arthritis.   All other systems reviewed and are negative.          PHYSICAL EXAM    I have reviewed the triage vital signs and nursing notes.    ED Triage Vitals [12/16/20 1710]   Temp Heart Rate Resp BP SpO2   97.5 °F (36.4 °C) 103 14 160/90 100 %      Temp src Heart Rate Source Patient Position BP Location FiO2 (%)   -- -- -- -- --       Physical Exam  GENERAL: Alert male in no apparent distress, oriented was 3 and quite pleasant.  HENT: nares patent, atraumatic  EYES: no scleral icterus  CV: regular rhythm, regular rate-no murmur  RESPIRATORY: normal effort clear to auscultation bilateral-saturations 98% on room air  ABDOMEN: soft no tenderness to palpation  MUSCULOSKELETAL: no deformity no segment swelling or tenderness to palpation  NEURO: Strength, sensation, and coordination are grossly intact.  Speech and mentation are unremarkable  SKIN: warm, dry      LAB RESULTS  Recent Results (from the past 24 hour(s))   Comprehensive Metabolic Panel    Collection Time: 12/16/20  5:29 PM    Specimen: Blood   Result Value Ref Range    Glucose 105 (H) 65 - 99 mg/dL    BUN 14 6 - 20 mg/dL    Creatinine 1.04 0.76 - 1.27 mg/dL    Sodium 140 136 - 145 mmol/L    Potassium 4.3 3.5 - 5.2 mmol/L    Chloride 101 98 - 107 mmol/L    CO2 26.1 22.0 - 29.0 mmol/L    Calcium 9.1 8.6 - 10.5 mg/dL    Total Protein 6.7 6.0 - 8.5 g/dL    Albumin 4.40 3.50 - 5.20 g/dL    ALT (SGPT) 23 1 - 41 U/L    AST (SGOT) 21 1 - 40 U/L    Alkaline Phosphatase 71 39 - 117 U/L    Total Bilirubin 0.7 0.0 - 1.2 mg/dL    eGFR Non African Amer 75 >60 mL/min/1.73    Globulin 2.3 gm/dL    A/G Ratio 1.9 g/dL    BUN/Creatinine Ratio 13.5 7.0 - 25.0    Anion Gap 12.9 5.0 - 15.0 mmol/L   Troponin    Collection Time: 12/16/20  5:29  PM    Specimen: Blood   Result Value Ref Range    Troponin T <0.010 0.000 - 0.030 ng/mL   Light Blue Top    Collection Time: 12/16/20  5:29 PM   Result Value Ref Range    Extra Tube hold for add-on    Green Top (Gel)    Collection Time: 12/16/20  5:29 PM   Result Value Ref Range    Extra Tube Hold for add-ons.    Lavender Top    Collection Time: 12/16/20  5:29 PM   Result Value Ref Range    Extra Tube hold for add-on    Gold Top - SST    Collection Time: 12/16/20  5:29 PM   Result Value Ref Range    Extra Tube Hold for add-ons.    CBC Auto Differential    Collection Time: 12/16/20  5:29 PM    Specimen: Blood   Result Value Ref Range    WBC 8.56 3.40 - 10.80 10*3/mm3    RBC 4.50 4.14 - 5.80 10*6/mm3    Hemoglobin 13.9 13.0 - 17.7 g/dL    Hematocrit 42.3 37.5 - 51.0 %    MCV 94.0 79.0 - 97.0 fL    MCH 30.9 26.6 - 33.0 pg    MCHC 32.9 31.5 - 35.7 g/dL    RDW 12.5 12.3 - 15.4 %    RDW-SD 42.9 37.0 - 54.0 fl    MPV 9.2 6.0 - 12.0 fL    Platelets 206 140 - 450 10*3/mm3    Neutrophil % 81.2 (H) 42.7 - 76.0 %    Lymphocyte % 9.5 (L) 19.6 - 45.3 %    Monocyte % 6.7 5.0 - 12.0 %    Eosinophil % 1.8 0.3 - 6.2 %    Basophil % 0.4 0.0 - 1.5 %    Immature Grans % 0.4 0.0 - 0.5 %    Neutrophils, Absolute 6.97 1.70 - 7.00 10*3/mm3    Lymphocytes, Absolute 0.81 0.70 - 3.10 10*3/mm3    Monocytes, Absolute 0.57 0.10 - 0.90 10*3/mm3    Eosinophils, Absolute 0.15 0.00 - 0.40 10*3/mm3    Basophils, Absolute 0.03 0.00 - 0.20 10*3/mm3    Immature Grans, Absolute 0.03 0.00 - 0.05 10*3/mm3    nRBC 0.0 0.0 - 0.2 /100 WBC   ECG 12 Lead    Collection Time: 12/16/20  5:39 PM   Result Value Ref Range    QT Interval 352 ms   Troponin    Collection Time: 12/16/20  7:40 PM    Specimen: Blood   Result Value Ref Range    Troponin T <0.010 0.000 - 0.030 ng/mL       Ordered the above labs and independently reviewed the results.      RADIOLOGY  Xr Chest 2 View    Result Date: 12/16/2020  XR CHEST 2 VW-  HISTORY: Male who is 50 years-old,  chest pain   TECHNIQUE: Frontal and lateral views of the chest  COMPARISON: 03/17/2019  FINDINGS: Heart, mediastinum and pulmonary vasculature are unremarkable. No focal pulmonary consolidation, pleural effusion, or pneumothorax. No acute osseous process.      No evidence for acute pulmonary process. Follow-up as clinical indications persist.  This report was finalized on 12/16/2020 6:27 PM by Dr. Dave Mcnamara M.D.        I ordered the above noted radiological studies. Reviewed by me and discussed with radiologist.  See dictation for official radiology interpretation.      PROCEDURES  Procedures      MEDICATIONS GIVEN IN ER    Medications   sodium chloride 0.9 % flush 10 mL (has no administration in time range)   aspirin tablet 325 mg (0 mg Oral Hold 12/16/20 1716)         PROGRESS, DATA ANALYSIS, CONSULTS, AND MEDICAL DECISION MAKING    All labs have been independently reviewed by me.  All radiology studies have been reviewed by me and discussed with radiologist dictating the report.   EKG's independently viewed and interpreted by me.  Discussion below represents my analysis of pertinent findings related to patient's condition, differential diagnosis, treatment plan and final disposition.      ED Course as of Dec 16 2045   Wed Dec 16, 2020   1925 I reviewed patient's prior medical records and note that he had a heart cath in 2019 that was negative for acute coronary disease.    [DB]   1925 EKG          EKG time: 1739  Rhythm/Rate: Sinus 87  P waves and VT: Normal P waves and VT intervals  QRS, axis: Normal axis, normal QRS  ST and T waves: Unremarkable ST and T waves    Interpreted Contemporaneously by me, independently viewed  Not significant change from 2019      [DB]   1926 I have reviewed chest x-ray I discussed with Dr. Mcnamara.  There is no acute disease of the chest.    [DB]   1926 Labs are reviewed and are fairly unremarkable.  Troponin is normal.  CBC and CMP were also unremarkable.    [DB]   1937 ED heart score  "is 3 with negative troponin drawn about 2 hours after the onset of pain.  Will repeat troponin II hours after initial.  Patient did have a cath less than 2 years ago that showed \"normal coronaries\".  If repeat troponin is negative and heart score is 3 I would let this patient go home to follow-up with Dr. Dalton as needed as outpatient.    [DB]   2043 Repeat troponin is negative and patient remains pain-free here in the ED.  At this point with recent negative heart cath and 2 - troponins I feel comfortable with letting him go home to follow-up as outpatient with Dr. Dalton.    [DB]      ED Course User Index  [DB] Duy Broussard MD       AS OF 20:45 EST VITALS:    BP - 157/95  HR - 80  TEMP - 97.5 °F (36.4 °C)  O2 SATS - 100%      DIAGNOSIS  Final diagnoses:   Chest pain, unspecified type         DISPOSITION  DISCHARGE    Patient discharged in stable condition.    Reviewed implications of results, diagnosis, meds, responsibility to follow up, warning signs and symptoms of possible worsening, potential complications and reasons to return to ER, including increased chest pain, shortness of breath or as needed.    Patient/Family voiced understanding of above instructions.    Discussed plan for discharge, as there is no emergent indication for admission. Patient referred to primary care provider for BP management due to today's BP. Pt/family is agreeable and understands need for follow up and repeat testing.  Pt is aware that discharge does not mean that nothing is wrong but it indicates no emergency is present that requires admission and they must continue care with follow-up as given below or physician of their choice.     FOLLOW-UP  Joel Dalton MD  [address] [id number] [phone]      Call for Appointment         Medication List      No changes were made to your prescriptions during this visit.                Duy Broussard MD  12/16/20 2045       Duy Broussard, " MD  12/16/20 2040

## 2020-12-23 ENCOUNTER — OFFICE VISIT (OUTPATIENT)
Dept: CARDIOLOGY | Facility: CLINIC | Age: 50
End: 2020-12-23

## 2020-12-23 VITALS
HEIGHT: 69 IN | SYSTOLIC BLOOD PRESSURE: 142 MMHG | DIASTOLIC BLOOD PRESSURE: 80 MMHG | HEART RATE: 80 BPM | BODY MASS INDEX: 40.11 KG/M2 | OXYGEN SATURATION: 99 % | WEIGHT: 270.8 LBS

## 2020-12-23 DIAGNOSIS — R07.89 CHEST PAIN, ATYPICAL: Primary | ICD-10-CM

## 2020-12-23 PROCEDURE — 99214 OFFICE O/P EST MOD 30 MIN: CPT | Performed by: NURSE PRACTITIONER

## 2020-12-23 RX ORDER — IRBESARTAN 300 MG/1
300 TABLET ORAL DAILY
COMMUNITY
Start: 2020-12-22

## 2020-12-23 NOTE — PROGRESS NOTES
Date of Office Visit: 20  Encounter Provider: JOSE Rey  Place of Service: [facility] CARDIOLOGY  Patient Name: Jose Almazan  :1970    Chief Complaint   Patient presents with   • Follow-up   :     HPI: Jose Almazan is a 50 y.o. male  with history of hypertension, hyperlipidemia, diabetes mellitus, asthma, GERD, obstructive sleep apnea, and obesity.  He also has history of kidney stones.  He is followed by Dr. Dalton.  I will visit with him for the first time today and have reviewed his medical record.  In 2016, he had a normal clinical and ECG response to exercise on treadmill stress test.  In 2018, he was evaluated for shortness of breath.  He also complained of diaphoresis and had a stress echo 2018 which was normal with no evidence for myocardial ischemia.  Ejection fraction 61% and there was mild concentric hypertrophy as well as mildly dilated left atrial cavity.  He proceeded to have coronary catheterization which showed minor plaque in the mid LAD of 10-20% otherwise normal vessels.    He presents today for ED follow up regarding chest pain.  He has 2 negative troponins.  And unremarkable CMP and CBC.  Chest x-ray showed no acute pulmonary process.  He apparently had an episode 3 to 4 days before lasting less than 5 minutes but wanted to get checked out.  He does not exercise.  He does not eat a healthy heart diet.  He wears CPAP every night.  He has cervical disc disease takes Celebrex 200 mg daily to help with that.  He has right arm numbness tingling which has had for multiple years secondary to his cervical disc disease.  He is a retired .  He denies palpitation, edema, lightheadedness, fatigue, near-syncope, syncope, shortness of breath.  He was recently switched from losartan to irbesartan by his PCP for better blood pressure control.        No Known Allergies    Past Medical History:   Diagnosis Date   • Asthma   "  • Diabetes mellitus (CMS/HCC)    • GERD (gastroesophageal reflux disease)    • Hyperlipidemia    • Hypertension    • Kidney stones    • Morbid obesity (CMS/HCC)    • GURDEEP on CPAP        Past Surgical History:   Procedure Laterality Date   • ABSCESS DRAINAGE N/A     rectal abscess   • CARDIAC CATHETERIZATION N/A 3/18/2019    Procedure: Left Heart Cath;  Surgeon: Nikolai Bolanos MD;  [facility];  Service: Cardiology   • CHOLECYSTECTOMY  2008   • HERNIA MESH REMOVAL  12/20/2018    removal of infected mesh & hernia repair   • INCISIONAL HERNIA REPAIR  08/29/2016    LAPAROSCOPIC REPAIR OF INCARCERATED INCISIONAL HERNIA CAUSING OBSTRUCTION WITH DUALMESH PLUS   • INCISIONAL HERNIA REPAIR N/A 08/12/2019   • KIDNEY STONE SURGERY     • LASIK     • TONSILLECTOMY           Family and social history reviewed.     ROS  All other systems were reviewed and are negative          Objective:     Vitals:    12/23/20 0836   BP: 142/80   BP Location: Left arm   Pulse: 80   SpO2: 99%   Weight: 123 kg (270 lb 12.8 oz)   Height: 175.3 cm (69\")     Body mass index is 39.99 kg/m².    PHYSICAL EXAM:  Constitutional:       General: Not in acute distress.     Appearance: Well-developed. Not diaphoretic.      Comments: Overweight   Eyes:      Conjunctiva/sclera: Conjunctivae normal.   HENT:      Head: Normocephalic.   Neck:      Musculoskeletal: Normal range of motion.      Vascular: No JVD.   Pulmonary:      Effort: Pulmonary effort is normal. No respiratory distress.      Breath sounds: Normal breath sounds. No wheezing. No rhonchi. No rales.   Chest:      Chest wall: Not tender to palpatation.   Cardiovascular:      Normal rate. Regular rhythm.   Abdominal:      General: Bowel sounds are normal. There is no distension.      Palpations: Abdomen is soft.   Musculoskeletal: Normal range of motion.   Skin:     General: Skin is warm and dry. There is no cyanosis.      Findings: No rash.   Neurological:      Mental " Status: Alert and oriented to person, place, and time.   Psychiatric:         Behavior: Behavior normal.         Thought Content: Thought content normal.         Judgment: Judgment normal.         Procedures-reviewed most recent ECG and compared to prior notes home    Current Outpatient Medications   Medication Sig Dispense Refill   • aspirin 325 MG tablet Take 325 mg by mouth Daily.     • celecoxib (CeleBREX) 200 MG capsule Take 200 mg by mouth Daily.     • Dapagliflozin Propanediol 10 MG tablet Take 10 mg by mouth Daily.     • glimepiride (AMARYL) 4 MG tablet Take 4 mg by mouth Every Morning Before Breakfast.     • glucose blood test strip 1 strip by Other route.     • Insulin Degludec (TRESIBA FLEXTOUCH SC) Inject 22 Units under the skin into the appropriate area as directed.     • Insulin Lispro (HUMALOG SC) Inject 5 Units under the skin into the appropriate area as directed 3 (Three) Times a Day Before Meals.     • irbesartan (AVAPRO) 300 MG tablet Take 300 mg by mouth Daily.     • Liraglutide (VICTOZA) 18 MG/3ML solution pen-injector injection Inject 1.8 mg under the skin.     • metFORMIN ER (GLUCOPHAGE-XR) 500 MG 24 hr tablet Take 2,000 mg by mouth.     • omeprazole (priLOSEC) 40 MG capsule Take 40 mg by mouth Daily.     • ROSUVASTATIN CALCIUM PO Take 20 mg by mouth Daily.       No current facility-administered medications for this visit.      Assessment:       Diagnosis Plan   1. Chest pain, atypical  Adult Stress Echo W/ Cont or Stress Agent if Necessary Per Protocol        Orders Placed This Encounter   Procedures   • Adult Stress Echo W/ Cont or Stress Agent if Necessary Per Protocol     Standing Status:   Future     Standing Expiration Date:   12/23/2021     Order Specific Question:   What stress agent will be used?     Answer:   Exercise with Possible [employer]     Order Specific Question:   Reason for exam?     Answer:   Chest Pain         Plan:   1.  50-year-old gentleman with history of  nonobstructive coronary artery disease noted as 10-20% mid LAD stenoses on catheterization March 2019.  Other vessels were normal  -Has some atypical chest discomfort but multiple risk factors so he is to return for stress echo to rule out ischemia.  2.  Hypertension blood pressure is elevated.  Discussed goal of less than 130/80.  He was recently switched to irbesartan. He is to track BP at home.  If still elevated consider adding amlodipine  3.  Hyperlipidemia on rosuvastatin 10 mg his target LDL is 70 or less.  Most recent LDL 69  4.  Diabetes mellitus on Insulin.  Adequately controlled most recent hemoglobin A1c 7.6  5.  Obstructive sleep apnea reports compliance with CPAP  6.  Obesity  7.  History of kidney stones  8.  History of asthma  9.  GERD on prilosec-educated him on minimizing NSAID use    Follow-up to be determined pending results of stress echo and blood pressure values            It has been a pleasure to participate in this patient's care.      Thank you,  JOSE Rey      **I used Dragon to dictate this note:**

## 2020-12-24 ENCOUNTER — TELEPHONE (OUTPATIENT)
Dept: CARDIOLOGY | Facility: CLINIC | Age: 50
End: 2020-12-24

## 2020-12-24 ENCOUNTER — HOSPITAL ENCOUNTER (OUTPATIENT)
Dept: CARDIOLOGY | Facility: HOSPITAL | Age: 50
Discharge: HOME | End: 2020-12-24
Admitting: NURSE PRACTITIONER

## 2020-12-24 VITALS
HEIGHT: 69 IN | SYSTOLIC BLOOD PRESSURE: 140 MMHG | BODY MASS INDEX: 39.99 KG/M2 | WEIGHT: 270 LBS | HEART RATE: 95 BPM | DIASTOLIC BLOOD PRESSURE: 80 MMHG

## 2020-12-24 DIAGNOSIS — R07.89 CHEST PAIN, ATYPICAL: ICD-10-CM

## 2020-12-24 LAB
AORTIC ARCH: 2.6 CM
ASCENDING AORTA: 2.5 CM
BH CV ECHO MEAS - ACS: 2.1 CM
BH CV ECHO MEAS - AO ARCH DIAM (PROXIMAL TRANS.): 2.6 CM
BH CV ECHO MEAS - AO MAX PG (FULL): 2.5 MMHG
BH CV ECHO MEAS - AO MAX PG: 5.2 MMHG
BH CV ECHO MEAS - AO MEAN PG (FULL): 1.8 MMHG
BH CV ECHO MEAS - AO MEAN PG: 3.5 MMHG
BH CV ECHO MEAS - AO ROOT AREA (BSA CORRECTED): 1.2
BH CV ECHO MEAS - AO ROOT AREA: 6.7 CM^2
BH CV ECHO MEAS - AO ROOT DIAM: 2.9 CM
BH CV ECHO MEAS - AO V2 MAX: 114 CM/SEC
BH CV ECHO MEAS - AO V2 MEAN: 90.1 CM/SEC
BH CV ECHO MEAS - AO V2 VTI: 21.9 CM
BH CV ECHO MEAS - ASC AORTA: 2.5 CM
BH CV ECHO MEAS - AVA(I,A): 2.7 CM^2
BH CV ECHO MEAS - AVA(I,D): 2.7 CM^2
BH CV ECHO MEAS - AVA(V,A): 2.5 CM^2
BH CV ECHO MEAS - AVA(V,D): 2.5 CM^2
BH CV ECHO MEAS - BSA(HAYCOCK): 2.5 M^2
BH CV ECHO MEAS - BSA: 2.3 M^2
BH CV ECHO MEAS - BZI_BMI: 39.9 KILOGRAMS/M^2
BH CV ECHO MEAS - BZI_METRIC_HEIGHT: 175.3 CM
BH CV ECHO MEAS - BZI_METRIC_WEIGHT: 122.5 KG
BH CV ECHO MEAS - EDV(MOD-SP4): 146 ML
BH CV ECHO MEAS - EDV(TEICH): 103.8 ML
BH CV ECHO MEAS - EF(CUBED): 60.7 %
BH CV ECHO MEAS - EF(MOD-BP): 63 %
BH CV ECHO MEAS - EF(MOD-SP4): 62.3 %
BH CV ECHO MEAS - EF(TEICH): 52.2 %
BH CV ECHO MEAS - ESV(MOD-SP4): 55 ML
BH CV ECHO MEAS - ESV(TEICH): 49.6 ML
BH CV ECHO MEAS - FS: 26.7 %
BH CV ECHO MEAS - IVS/LVPW: 0.99
BH CV ECHO MEAS - IVSD: 1.2 CM
BH CV ECHO MEAS - LAT PEAK E' VEL: 8.3 CM/SEC
BH CV ECHO MEAS - LV DIASTOLIC VOL/BSA (35-75): 62.2 ML/M^2
BH CV ECHO MEAS - LV MASS(C)D: 210.8 GRAMS
BH CV ECHO MEAS - LV MASS(C)DI: 89.8 GRAMS/M^2
BH CV ECHO MEAS - LV MAX PG: 2.7 MMHG
BH CV ECHO MEAS - LV MEAN PG: 1.7 MMHG
BH CV ECHO MEAS - LV SYSTOLIC VOL/BSA (12-30): 23.4 ML/M^2
BH CV ECHO MEAS - LV V1 MAX: 82.3 CM/SEC
BH CV ECHO MEAS - LV V1 MEAN: 62.1 CM/SEC
BH CV ECHO MEAS - LV V1 VTI: 16.9 CM
BH CV ECHO MEAS - LVIDD: 4.7 CM
BH CV ECHO MEAS - LVIDS: 3.5 CM
BH CV ECHO MEAS - LVLD AP4: 7.5 CM
BH CV ECHO MEAS - LVLS AP4: 6.2 CM
BH CV ECHO MEAS - LVOT AREA (M): 3.5 CM^2
BH CV ECHO MEAS - LVOT AREA: 3.5 CM^2
BH CV ECHO MEAS - LVOT DIAM: 2.1 CM
BH CV ECHO MEAS - LVPWD: 1.2 CM
BH CV ECHO MEAS - MED PEAK E' VEL: 7 CM/SEC
BH CV ECHO MEAS - MV A DUR: 0.1 SEC
BH CV ECHO MEAS - MV A MAX VEL: 77.6 CM/SEC
BH CV ECHO MEAS - MV DEC SLOPE: 200.5 CM/SEC^2
BH CV ECHO MEAS - MV DEC TIME: 0.2 SEC
BH CV ECHO MEAS - MV E MAX VEL: 52.7 CM/SEC
BH CV ECHO MEAS - MV E/A: 0.68
BH CV ECHO MEAS - MV MAX PG: 1.8 MMHG
BH CV ECHO MEAS - MV MEAN PG: 1 MMHG
BH CV ECHO MEAS - MV P1/2T MAX VEL: 57 CM/SEC
BH CV ECHO MEAS - MV P1/2T: 83.3 MSEC
BH CV ECHO MEAS - MV V2 MAX: 66.5 CM/SEC
BH CV ECHO MEAS - MV V2 MEAN: 48.8 CM/SEC
BH CV ECHO MEAS - MV V2 VTI: 15.7 CM
BH CV ECHO MEAS - MVA P1/2T LCG: 3.9 CM^2
BH CV ECHO MEAS - MVA(P1/2T): 2.6 CM^2
BH CV ECHO MEAS - MVA(VTI): 3.7 CM^2
BH CV ECHO MEAS - PULM A REVS DUR: 0.08 SEC
BH CV ECHO MEAS - PULM A REVS VEL: 23.4 CM/SEC
BH CV ECHO MEAS - PULM DIAS VEL: 44.1 CM/SEC
BH CV ECHO MEAS - PULM S/D: 0.94
BH CV ECHO MEAS - PULM SYS VEL: 41.3 CM/SEC
BH CV ECHO MEAS - SI(AO): 62.3 ML/M^2
BH CV ECHO MEAS - SI(CUBED): 27.3 ML/M^2
BH CV ECHO MEAS - SI(LVOT): 25 ML/M^2
BH CV ECHO MEAS - SI(MOD-SP4): 38.8 ML/M^2
BH CV ECHO MEAS - SI(TEICH): 23.1 ML/M^2
BH CV ECHO MEAS - SUP REN AO DIAM: 1.7 CM
BH CV ECHO MEAS - SV(AO): 146.2 ML
BH CV ECHO MEAS - SV(CUBED): 64.1 ML
BH CV ECHO MEAS - SV(LVOT): 58.8 ML
BH CV ECHO MEAS - SV(MOD-SP4): 91 ML
BH CV ECHO MEAS - SV(TEICH): 54.2 ML
BH CV ECHO MEAS - TAPSE (>1.6): 1.7 CM
BH CV ECHO MEAS - TR MAX VEL: 176.5 CM/SEC
BH CV ECHO MEASUREMENTS AVERAGE E/E' RATIO: 6.89
BH CV STRESS BP STAGE 1: NORMAL
BH CV STRESS BP STAGE 2: NORMAL
BH CV STRESS DURATION MIN STAGE 1: 3
BH CV STRESS DURATION MIN STAGE 2: 3
BH CV STRESS DURATION MIN STAGE 3: 2
BH CV STRESS DURATION SEC STAGE 1: 0
BH CV STRESS DURATION SEC STAGE 2: 0
BH CV STRESS DURATION SEC STAGE 3: 0
BH CV STRESS ECHO POST STRESS EJECTION FRACTION EF: 73 %
BH CV STRESS GRADE STAGE 1: 10
BH CV STRESS GRADE STAGE 2: 12
BH CV STRESS GRADE STAGE 3: 14
BH CV STRESS HR STAGE 1: 129
BH CV STRESS HR STAGE 2: 143
BH CV STRESS HR STAGE 3: 156
BH CV STRESS METS STAGE 1: 5
BH CV STRESS METS STAGE 2: 7.5
BH CV STRESS METS STAGE 3: 10
BH CV STRESS PROTOCOL 1: NORMAL
BH CV STRESS SPEED STAGE 1: 1.7
BH CV STRESS SPEED STAGE 2: 2.5
BH CV STRESS SPEED STAGE 3: 3.4
BH CV STRESS STAGE 1: 1
BH CV STRESS STAGE 2: 2
BH CV STRESS STAGE 3: 3
BH CV XLRA - RV BASE: 3.2 CM
BH CV XLRA - RV LENGTH: 7.9 CM
BH CV XLRA - RV MID: 2.4 CM
BH CV XLRA - TDI S': 11.9 CM/SEC
LEFT ATRIUM VOLUME INDEX: 21 ML/M2
MAXIMAL PREDICTED HEART RATE: 169 BPM
PERCENT MAX PREDICTED HR: 92.31 %
SINUS: 2.8 CM
STJ: 2.8 CM
STRESS BASELINE BP: NORMAL MMHG
STRESS BASELINE HR: 95 BPM
STRESS PERCENT HR: 109 %
STRESS POST ESTIMATED WORKLOAD: 9 METS
STRESS POST EXERCISE DUR MIN: 8 MIN
STRESS POST EXERCISE DUR SEC: 0 SEC
STRESS POST PEAK BP: NORMAL MMHG
STRESS POST PEAK HR: 156 BPM
STRESS TARGET HR: 144 BPM

## 2020-12-24 PROCEDURE — 93350 STRESS TTE ONLY: CPT | Performed by: INTERNAL MEDICINE

## 2020-12-24 PROCEDURE — 93325 DOPPLER ECHO COLOR FLOW MAPG: CPT

## 2020-12-24 PROCEDURE — 93350 STRESS TTE ONLY: CPT

## 2020-12-24 PROCEDURE — 25010000002 PERFLUTREN (DEFINITY) 8.476 MG IN SODIUM CHLORIDE 0.9 % 10 ML INJECTION: Performed by: NURSE PRACTITIONER

## 2020-12-24 PROCEDURE — 93016 CV STRESS TEST SUPVJ ONLY: CPT | Performed by: INTERNAL MEDICINE

## 2020-12-24 PROCEDURE — 93017 CV STRESS TEST TRACING ONLY: CPT

## 2020-12-24 PROCEDURE — 93018 CV STRESS TEST I&R ONLY: CPT | Performed by: INTERNAL MEDICINE

## 2020-12-24 PROCEDURE — 93320 DOPPLER ECHO COMPLETE: CPT

## 2020-12-24 PROCEDURE — 93320 DOPPLER ECHO COMPLETE: CPT | Performed by: INTERNAL MEDICINE

## 2020-12-24 PROCEDURE — 93352 ADMIN ECG CONTRAST AGENT: CPT | Performed by: INTERNAL MEDICINE

## 2020-12-24 PROCEDURE — 93325 DOPPLER ECHO COLOR FLOW MAPG: CPT | Performed by: INTERNAL MEDICINE

## 2020-12-24 RX ADMIN — PERFLUTREN 3 ML: 6.52 INJECTION, SUSPENSION INTRAVENOUS at 09:46

## 2020-12-24 NOTE — TELEPHONE ENCOUNTER
Spoke with patient stress echo negative for ischemia.  Mild MR noted and he had no chest pain on the stress test he is to call with any questions or concerns

## 2022-06-02 ENCOUNTER — HOSPITAL ENCOUNTER (OUTPATIENT)
Facility: HOSPITAL | Age: 52
Setting detail: OBSERVATION
Discharge: HOME | End: 2022-06-03
Attending: EMERGENCY MEDICINE | Admitting: EMERGENCY MEDICINE

## 2022-06-02 ENCOUNTER — TELEPHONE (OUTPATIENT)
Dept: CARDIOLOGY | Facility: CLINIC | Age: 52
End: 2022-06-02

## 2022-06-02 ENCOUNTER — APPOINTMENT (OUTPATIENT)
Dept: GENERAL RADIOLOGY | Facility: HOSPITAL | Age: 52
End: 2022-06-02

## 2022-06-02 DIAGNOSIS — R07.9 CHEST PAIN WITH HIGH RISK FOR CARDIAC ETIOLOGY: Primary | ICD-10-CM

## 2022-06-02 DIAGNOSIS — R74.8 ELEVATED LIPASE: ICD-10-CM

## 2022-06-02 DIAGNOSIS — B02.9 HERPES ZOSTER WITHOUT COMPLICATION: ICD-10-CM

## 2022-06-02 LAB
ALBUMIN SERPL-MCNC: 4.3 G/DL (ref 3.5–5.2)
ALBUMIN/GLOB SERPL: 1.4 G/DL
ALP SERPL-CCNC: 84 U/L (ref 39–117)
ALT SERPL W P-5'-P-CCNC: 30 U/L (ref 1–41)
ANION GAP SERPL CALCULATED.3IONS-SCNC: 15.5 MMOL/L (ref 5–15)
AST SERPL-CCNC: 31 U/L (ref 1–40)
BASOPHILS # BLD AUTO: 0.04 10*3/MM3 (ref 0–0.2)
BASOPHILS NFR BLD AUTO: 0.6 % (ref 0–1.5)
BILIRUB SERPL-MCNC: 1.4 MG/DL (ref 0–1.2)
BUN SERPL-MCNC: 17 MG/DL (ref 6–20)
BUN/CREAT SERPL: 13.7 (ref 7–25)
CALCIUM SPEC-SCNC: 9.8 MG/DL (ref 8.6–10.5)
CHLORIDE SERPL-SCNC: 98 MMOL/L (ref 98–107)
CHOLEST SERPL-MCNC: 123 MG/DL (ref 0–200)
CO2 SERPL-SCNC: 21.5 MMOL/L (ref 22–29)
CREAT SERPL-MCNC: 1.24 MG/DL (ref 0.76–1.27)
DEPRECATED RDW RBC AUTO: 44.6 FL (ref 37–54)
EGFRCR SERPLBLD CKD-EPI 2021: 69.5 ML/MIN/1.73
EOSINOPHIL # BLD AUTO: 0.22 10*3/MM3 (ref 0–0.4)
EOSINOPHIL NFR BLD AUTO: 3.1 % (ref 0.3–6.2)
ERYTHROCYTE [DISTWIDTH] IN BLOOD BY AUTOMATED COUNT: 12.9 % (ref 12.3–15.4)
GLOBULIN UR ELPH-MCNC: 3 GM/DL
GLUCOSE SERPL-MCNC: 159 MG/DL (ref 65–99)
HCT VFR BLD AUTO: 46.7 % (ref 37.5–51)
HDLC SERPL-MCNC: 39 MG/DL (ref 40–60)
HGB BLD-MCNC: 15.3 G/DL (ref 13–17.7)
HOLD SPECIMEN: NORMAL
IMM GRANULOCYTES # BLD AUTO: 0.02 10*3/MM3 (ref 0–0.05)
IMM GRANULOCYTES NFR BLD AUTO: 0.3 % (ref 0–0.5)
LDLC SERPL CALC-MCNC: 56 MG/DL (ref 0–100)
LDLC/HDLC SERPL: 1.3 {RATIO}
LIPASE SERPL-CCNC: 137 U/L (ref 13–60)
LYMPHOCYTES # BLD AUTO: 0.9 10*3/MM3 (ref 0.7–3.1)
LYMPHOCYTES NFR BLD AUTO: 12.5 % (ref 19.6–45.3)
MCH RBC QN AUTO: 30.9 PG (ref 26.6–33)
MCHC RBC AUTO-ENTMCNC: 32.8 G/DL (ref 31.5–35.7)
MCV RBC AUTO: 94.3 FL (ref 79–97)
MONOCYTES # BLD AUTO: 0.63 10*3/MM3 (ref 0.1–0.9)
MONOCYTES NFR BLD AUTO: 8.8 % (ref 5–12)
NEUTROPHILS NFR BLD AUTO: 5.37 10*3/MM3 (ref 1.7–7)
NEUTROPHILS NFR BLD AUTO: 74.7 % (ref 42.7–76)
NRBC BLD AUTO-RTO: 0 /100 WBC (ref 0–0.2)
NT-PROBNP SERPL-MCNC: 65.1 PG/ML (ref 0–900)
PLATELET # BLD AUTO: 199 10*3/MM3 (ref 140–450)
PMV BLD AUTO: 9.6 FL (ref 6–12)
POTASSIUM SERPL-SCNC: 5.1 MMOL/L (ref 3.5–5.2)
PROT SERPL-MCNC: 7.3 G/DL (ref 6–8.5)
QT INTERVAL: 331 MS
RBC # BLD AUTO: 4.95 10*6/MM3 (ref 4.14–5.8)
SARS-COV-2 RNA RESP QL NAA+PROBE: NOT DETECTED
SODIUM SERPL-SCNC: 135 MMOL/L (ref 136–145)
TRIGL SERPL-MCNC: 167 MG/DL (ref 0–150)
TROPONIN T SERPL-MCNC: <0.01 NG/ML (ref 0–0.03)
TROPONIN T SERPL-MCNC: <0.01 NG/ML (ref 0–0.03)
VLDLC SERPL-MCNC: 28 MG/DL (ref 5–40)
WBC NRBC COR # BLD: 7.18 10*3/MM3 (ref 3.4–10.8)

## 2022-06-02 PROCEDURE — G0378 HOSPITAL OBSERVATION PER HR: HCPCS

## 2022-06-02 PROCEDURE — 84484 ASSAY OF TROPONIN QUANT: CPT | Performed by: EMERGENCY MEDICINE

## 2022-06-02 PROCEDURE — 71045 X-RAY EXAM CHEST 1 VIEW: CPT

## 2022-06-02 PROCEDURE — 93005 ELECTROCARDIOGRAM TRACING: CPT

## 2022-06-02 PROCEDURE — 99284 EMERGENCY DEPT VISIT MOD MDM: CPT

## 2022-06-02 PROCEDURE — 93010 ELECTROCARDIOGRAM REPORT: CPT | Performed by: INTERNAL MEDICINE

## 2022-06-02 PROCEDURE — U0003 INFECTIOUS AGENT DETECTION BY NUCLEIC ACID (DNA OR RNA); SEVERE ACUTE RESPIRATORY SYNDROME CORONAVIRUS 2 (SARS-COV-2) (CORONAVIRUS DISEASE [COVID-19]), AMPLIFIED PROBE TECHNIQUE, MAKING USE OF HIGH THROUGHPUT TECHNOLOGIES AS DESCRIBED BY CMS-2020-01-R: HCPCS | Performed by: EMERGENCY MEDICINE

## 2022-06-02 PROCEDURE — 83880 ASSAY OF NATRIURETIC PEPTIDE: CPT | Performed by: EMERGENCY MEDICINE

## 2022-06-02 PROCEDURE — 83690 ASSAY OF LIPASE: CPT | Performed by: EMERGENCY MEDICINE

## 2022-06-02 PROCEDURE — C9803 HOPD COVID-19 SPEC COLLECT: HCPCS

## 2022-06-02 PROCEDURE — 85025 COMPLETE CBC W/AUTO DIFF WBC: CPT | Performed by: EMERGENCY MEDICINE

## 2022-06-02 PROCEDURE — 80053 COMPREHEN METABOLIC PANEL: CPT | Performed by: EMERGENCY MEDICINE

## 2022-06-02 PROCEDURE — 93005 ELECTROCARDIOGRAM TRACING: CPT | Performed by: EMERGENCY MEDICINE

## 2022-06-02 PROCEDURE — 80061 LIPID PANEL: CPT | Performed by: EMERGENCY MEDICINE

## 2022-06-02 PROCEDURE — 96374 THER/PROPH/DIAG INJ IV PUSH: CPT

## 2022-06-02 RX ORDER — SODIUM CHLORIDE 0.9 % (FLUSH) 0.9 %
10 SYRINGE (ML) INJECTION AS NEEDED
Status: DISCONTINUED | OUTPATIENT
Start: 2022-06-02 | End: 2022-06-03 | Stop reason: HOSPADM

## 2022-06-02 RX ORDER — VALACYCLOVIR HYDROCHLORIDE 500 MG/1
1000 TABLET, FILM COATED ORAL 3 TIMES DAILY
Status: DISCONTINUED | OUTPATIENT
Start: 2022-06-03 | End: 2022-06-03 | Stop reason: HOSPADM

## 2022-06-02 RX ORDER — PANTOPRAZOLE SODIUM 40 MG/1
40 TABLET, DELAYED RELEASE ORAL EVERY MORNING
Status: DISCONTINUED | OUTPATIENT
Start: 2022-06-03 | End: 2022-06-02

## 2022-06-02 RX ORDER — CELECOXIB 200 MG/1
200 CAPSULE ORAL DAILY
Status: DISCONTINUED | OUTPATIENT
Start: 2022-06-03 | End: 2022-06-03 | Stop reason: HOSPADM

## 2022-06-02 RX ORDER — INSULIN LISPRO 100 [IU]/ML
0-9 INJECTION, SOLUTION INTRAVENOUS; SUBCUTANEOUS
Status: DISCONTINUED | OUTPATIENT
Start: 2022-06-03 | End: 2022-06-03 | Stop reason: HOSPADM

## 2022-06-02 RX ORDER — SODIUM CHLORIDE 0.9 % (FLUSH) 0.9 %
10 SYRINGE (ML) INJECTION EVERY 12 HOURS SCHEDULED
Status: DISCONTINUED | OUTPATIENT
Start: 2022-06-02 | End: 2022-06-03 | Stop reason: HOSPADM

## 2022-06-02 RX ORDER — DEXTROSE MONOHYDRATE 25 G/50ML
25 INJECTION, SOLUTION INTRAVENOUS
Status: DISCONTINUED | OUTPATIENT
Start: 2022-06-02 | End: 2022-06-03 | Stop reason: HOSPADM

## 2022-06-02 RX ORDER — PANTOPRAZOLE SODIUM 40 MG/10ML
40 INJECTION, POWDER, LYOPHILIZED, FOR SOLUTION INTRAVENOUS ONCE
Status: DISCONTINUED | OUTPATIENT
Start: 2022-06-02 | End: 2022-06-02

## 2022-06-02 RX ORDER — FAMOTIDINE 10 MG/ML
20 INJECTION, SOLUTION INTRAVENOUS ONCE
Status: COMPLETED | OUTPATIENT
Start: 2022-06-02 | End: 2022-06-02

## 2022-06-02 RX ORDER — CELECOXIB 200 MG/1
200 CAPSULE ORAL DAILY
Status: DISCONTINUED | OUTPATIENT
Start: 2022-06-02 | End: 2022-06-02

## 2022-06-02 RX ORDER — VALACYCLOVIR HYDROCHLORIDE 500 MG/1
1000 TABLET, FILM COATED ORAL ONCE
Status: COMPLETED | OUTPATIENT
Start: 2022-06-02 | End: 2022-06-02

## 2022-06-02 RX ORDER — ALUMINA, MAGNESIA, AND SIMETHICONE 2400; 2400; 240 MG/30ML; MG/30ML; MG/30ML
15 SUSPENSION ORAL ONCE
Status: COMPLETED | OUTPATIENT
Start: 2022-06-02 | End: 2022-06-02

## 2022-06-02 RX ORDER — CELECOXIB 200 MG/1
1 CAPSULE ORAL DAILY
COMMUNITY
Start: 2022-02-14

## 2022-06-02 RX ORDER — NICOTINE POLACRILEX 4 MG
15 LOZENGE BUCCAL
Status: DISCONTINUED | OUTPATIENT
Start: 2022-06-02 | End: 2022-06-03 | Stop reason: HOSPADM

## 2022-06-02 RX ORDER — LOSARTAN POTASSIUM 100 MG/1
100 TABLET ORAL
Status: DISCONTINUED | OUTPATIENT
Start: 2022-06-03 | End: 2022-06-03 | Stop reason: HOSPADM

## 2022-06-02 RX ADMIN — Medication 10 ML: at 22:23

## 2022-06-02 RX ADMIN — VALACYCLOVIR HYDROCHLORIDE 1000 MG: 500 TABLET, FILM COATED ORAL at 17:05

## 2022-06-02 RX ADMIN — ALUMINUM HYDROXIDE, MAGNESIUM HYDROXIDE, AND DIMETHICONE 15 ML: 400; 400; 40 SUSPENSION ORAL at 22:23

## 2022-06-02 RX ADMIN — FAMOTIDINE 20 MG: 10 INJECTION, SOLUTION INTRAVENOUS at 19:08

## 2022-06-02 NOTE — TELEPHONE ENCOUNTER
Patient and his wife both called the office and left voicemails stating the patient is going to to ED for severe chest pain and SOA. I called the patient's wife back and she reports the patient has been complaining of indegestion and heartburn for 3 days, today he started experiencing severe chest pain in the center of his chest and and shortness of air after cutting the grass. She is on her way to the ED, the patient has already arrived.     Kaila Stockton RN  [facility] Triage  06/02/22  15:55 EDT

## 2022-06-02 NOTE — ED PROVIDER NOTES
EMERGENCY DEPARTMENT ENCOUNTER    Room Number:  43/43  Date of encounter:  6/2/2022  PCP: Real Alarcon MD  Historian: Patient, wife    HPI:  Chief Complaint: Chest pain  A complete HPI/ROS/PMH/PSH/SH/FH are unobtainable due to: None    Context: Jose Almazan is a 52 y.o. male who presents to the ED c/o chest pain.  Onset 2 hours ago.  Pain has been intermittent and last for minutes at a time.  A squeezing pain that is nonradiating.  At its worst pain is 3/10 in intensity.  He is currently pain-free.  He took a full dose aspirin earlier today.  He also notes that he has developed a rash to his left side of his abdomen that began this morning.  Review of systems otherwise negative.      PAST MEDICAL HISTORY  Active Ambulatory Problems     Diagnosis Date Noted   • GURDEEP (obstructive sleep apnea)    • Chest pain 03/17/2019   • Asthma 03/27/2018   • Diabetes mellitus type 2 in obese (HCC) 03/27/2018   • GERD (gastroesophageal reflux disease) 03/27/2018   • Hypercholesterolemia 03/27/2018   • Obesity 03/27/2018   • Perirectal abscess 07/22/2018   • Primary hypertension 03/27/2018   • Recurrent incisional hernia with incarceration 12/05/2018   • Rosacea 03/27/2018     Resolved Ambulatory Problems     Diagnosis Date Noted   • No Resolved Ambulatory Problems     Past Medical History:   Diagnosis Date   • Diabetes mellitus (HCC)    • Hyperlipidemia    • Hypertension    • Kidney stones    • Morbid obesity (HCC)    • GURDEEP on CPAP          PAST SURGICAL HISTORY  Past Surgical History:   Procedure Laterality Date   • ABSCESS DRAINAGE N/A     rectal abscess   • CARDIAC CATHETERIZATION N/A 3/18/2019    Procedure: Left Heart Cath;  Surgeon: Nikolai Bolanos MD;  Location: [facility] INVASIVE LOCATION;  Service: Cardiology   • CHOLECYSTECTOMY  2008   • HERNIA MESH REMOVAL  12/20/2018    removal of infected mesh & hernia repair   • INCISIONAL HERNIA REPAIR  08/29/2016    LAPAROSCOPIC REPAIR OF INCARCERATED INCISIONAL HERNIA  CAUSING OBSTRUCTION WITH DUALMESH PLUS   • INCISIONAL HERNIA REPAIR N/A 2019   • KIDNEY STONE SURGERY     • LASIK     • TONSILLECTOMY           FAMILY HISTORY  Family History   Problem Relation Age of Onset   • Heart disease Mother         Mother  from MI and perforated coronary artery during cath   • Heart disease Father         Father with 4 vessel CABG in his late 40's.  Also with 2 MI's.   • Diabetes Father    • Down syndrome Sister    • Heart disease Sister    • Coronary artery disease Paternal Grandfather         Paternal GF with 3 MI's         SOCIAL HISTORY  Social History     Socioeconomic History   • Marital status:    Tobacco Use   • Smoking status: Never Smoker   • Smokeless tobacco: Never Used   • Tobacco comment: caffeine use- diet coke, tea   Substance and Sexual Activity   • Alcohol use: Yes     Comment: Socially.  3-4 beers per week on average.   • Drug use: No   • Sexual activity: Defer         ALLERGIES  Patient has no known allergies.        REVIEW OF SYSTEMS  Review of Systems     All systems reviewed and negative except for those discussed in HPI.       PHYSICAL EXAM    I have reviewed the triage vital signs and nursing notes.    ED Triage Vitals [22 1443]   Temp Heart Rate Resp BP SpO2   96.3 °F (35.7 °C) 98 16 -- 96 %      Temp src Heart Rate Source Patient Position BP Location FiO2 (%)   -- Monitor -- -- --       Physical Exam  GENERAL: not distressed  HENT: nares patent  EYES: no scleral icterus  CV: regular rhythm, regular rate, 2+ radial pulses bilaterally  RESPIRATORY: normal effort, clear to auscultation bilaterally  ABDOMEN: soft, periumbilical hernia that is reducible  MUSCULOSKELETAL: no deformity  NEURO: alert, moves all extremities, follows commands  SKIN: warm, dry, vesicular rash in a dermatomal distribution at approximately T8 level to the left side of the abdomen        LAB RESULTS  Recent Results (from the past 24 hour(s))   ECG 12 Lead    Collection  Time: 06/02/22  3:03 PM   Result Value Ref Range    QT Interval 331 ms   Green Top (Gel)    Collection Time: 06/02/22  4:15 PM   Result Value Ref Range    Extra Tube Hold for add-ons.    BNP    Collection Time: 06/02/22  4:15 PM    Specimen: Blood   Result Value Ref Range    proBNP 65.1 0.0 - 900.0 pg/mL   Comprehensive Metabolic Panel    Collection Time: 06/02/22  4:15 PM    Specimen: Blood   Result Value Ref Range    Glucose 159 (H) 65 - 99 mg/dL    BUN 17 6 - 20 mg/dL    Creatinine 1.24 0.76 - 1.27 mg/dL    Sodium 135 (L) 136 - 145 mmol/L    Potassium 5.1 3.5 - 5.2 mmol/L    Chloride 98 98 - 107 mmol/L    CO2 21.5 (L) 22.0 - 29.0 mmol/L    Calcium 9.8 8.6 - 10.5 mg/dL    Total Protein 7.3 6.0 - 8.5 g/dL    Albumin 4.30 3.50 - 5.20 g/dL    ALT (SGPT) 30 1 - 41 U/L    AST (SGOT) 31 1 - 40 U/L    Alkaline Phosphatase 84 39 - 117 U/L    Total Bilirubin 1.4 (H) 0.0 - 1.2 mg/dL    Globulin 3.0 gm/dL    A/G Ratio 1.4 g/dL    BUN/Creatinine Ratio 13.7 7.0 - 25.0    Anion Gap 15.5 (H) 5.0 - 15.0 mmol/L    eGFR 69.5 >60.0 mL/min/1.73   Lipase    Collection Time: 06/02/22  4:15 PM    Specimen: Blood   Result Value Ref Range    Lipase 137 (H) 13 - 60 U/L   Troponin    Collection Time: 06/02/22  4:15 PM    Specimen: Blood   Result Value Ref Range    Troponin T <0.010 0.000 - 0.030 ng/mL   COVID-19,[facility] IN-HOUSE CEPHEID/YESSICA NP SWAB IN TRANSPORT MEDIA 8-12 HR TAT - Swab, Nasopharynx    Collection Time: 06/02/22  4:53 PM    Specimen: Nasopharynx; Swab   Result Value Ref Range    COVID19 Not Detected Not Detected - Ref. Range   CBC Auto Differential    Collection Time: 06/02/22  5:24 PM    Specimen: Blood   Result Value Ref Range    WBC 7.18 3.40 - 10.80 10*3/mm3    RBC 4.95 4.14 - 5.80 10*6/mm3    Hemoglobin 15.3 13.0 - 17.7 g/dL    Hematocrit 46.7 37.5 - 51.0 %    MCV 94.3 79.0 - 97.0 fL    MCH 30.9 26.6 - 33.0 pg    MCHC 32.8 31.5 - 35.7 g/dL    RDW 12.9 12.3 - 15.4 %    RDW-SD 44.6 37.0 - 54.0 fl    MPV 9.6 6.0 - 12.0 fL     Platelets 199 140 - 450 10*3/mm3    Neutrophil % 74.7 42.7 - 76.0 %    Lymphocyte % 12.5 (L) 19.6 - 45.3 %    Monocyte % 8.8 5.0 - 12.0 %    Eosinophil % 3.1 0.3 - 6.2 %    Basophil % 0.6 0.0 - 1.5 %    Immature Grans % 0.3 0.0 - 0.5 %    Neutrophils, Absolute 5.37 1.70 - 7.00 10*3/mm3    Lymphocytes, Absolute 0.90 0.70 - 3.10 10*3/mm3    Monocytes, Absolute 0.63 0.10 - 0.90 10*3/mm3    Eosinophils, Absolute 0.22 0.00 - 0.40 10*3/mm3    Basophils, Absolute 0.04 0.00 - 0.20 10*3/mm3    Immature Grans, Absolute 0.02 0.00 - 0.05 10*3/mm3    nRBC 0.0 0.0 - 0.2 /100 WBC       Ordered the above labs and independently reviewed the results.        RADIOLOGY  XR Chest 1 View    Result Date: 6/2/2022  XR CHEST 1 VW-  06/02/2022  HISTORY: Chest pain.  Heart size is within normal limits. Lungs are slightly underinflated but appear clear. Bones and soft tissues are unremarkable.      No acute process.  This report was finalized on 6/2/2022 4:55 PM by Dr. Esvin Cisneros M.D.        I ordered the above noted radiological studies. Reviewed by me and discussed with radiologist.  See dictation for official radiology interpretation.      PROCEDURES    Procedures      MEDICATIONS GIVEN IN ER    Medications   sodium chloride 0.9 % flush 10 mL (has no administration in time range)   valACYclovir (VALTREX) tablet 1,000 mg (1,000 mg Oral Given 6/2/22 1705)         PROGRESS, DATA ANALYSIS, CONSULTS, AND MEDICAL DECISION MAKING    All labs have been independently reviewed by me.  All radiology studies have been reviewed by me and discussed with radiologist dictating the report.   EKG's independently viewed and interpreted by me.  Discussion below represents my analysis of pertinent findings related to patient's condition, differential diagnosis, treatment plan and final disposition.    Differential diagnosis includes but not limited to acute coronary syndrome, pulmonary embolism, thoracic aortic dissection, pneumonia, pneumothorax,  musculoskeletal pain, GERD or esophageal spasm, anxiety, myocarditis/pericarditis, esophageal rupture, pancreatitis.     History: 1  EC  Age: 1  Risk factors: 2    HEART score: 4    ED Course as of 22 1831   Thu 2022   1617 On medical chart review, patient had a stress echo performed on 2020.  This was a normal stress echo with no significant evidence of myocardial ischemia consistent with a low risk study.  Ejection fraction 63%.  Grade 1 diastolic dysfunction. [TD]   1628 EKG interpreted by myself.  Time 1503.  Sinus rhythm.  Heart rate 101.  Normal axis and intervals.  No acute ST abnormality. [TD]   1739 Lipase(!): 137 [TD]      ED Course User Index  [TD] Rc Louis II, MD       IV the patient's lipase is incidental.  He has no pain at this time.  Additionally the pain that he did have was not characteristic for pancreatitis.  Furthermore, his lipase is only approximately twice episode of normal which is not consistent with diagnostic guidelines.    I discussed the case with JOSE Hood for observation medicine.  We reviewed the patient's labs, history, imaging.  She will admit for further cardiac restratification.    PPE: The patient wore a surgical mask throughout the entire patient encounter. I wore an N95.    AS OF 18:31 EDT VITALS:    BP - 132/82  HR - 90  TEMP - 96.3 °F (35.7 °C)  O2 SATS - 96%        DIAGNOSIS  Final diagnoses:   Chest pain with high risk for cardiac etiology   Herpes zoster without complication   Elevated lipase         DISPOSITION  Admit           Rc Louis II, MD  [id number]

## 2022-06-02 NOTE — ED TRIAGE NOTES
Cp started 90 minutes ago    Patient was placed in face mask during first look triage.  Patient was wearing a face mask throughout encounter.  I wore personal protective equipment throughout the encounter.  Hand hygiene was performed before and after patient encounter.

## 2022-06-02 NOTE — PROGRESS NOTES
MD ATTESTATION NOTE    The MAURICIO and I have discussed this patient's history, physical exam, and treatment plan.  I have reviewed the documentation and personally had a face to face interaction with the patient. I affirm the documentation and agree with the treatment and plan.  The attached note describes my personal findings.      I provided a substantive portion of the care of the patient.  I personally performed the physical exam in its entirety, and below are my findings.  For this patient encounter, the patient wore surgical mask, I wore full protective PPE including N95 and eye protection.      Brief HPI: Patient presents for evaluation of chest pain.  Patient states symptoms happened this afternoon while he was mowing.  Patient describes it as pressure.  Pain was 3 out of 10.  Patient states did not go into his neck or into his arms.  Patient has had prior cardiac catheterization that was normal.  Patient also has red painful rash on the left side of his abdomen.  Patient was seen in the emergency department and had EKG and troponin that were normal.  Patient was started on Valtrex for his shingles.  Was admitted to the observation unit for further evaluation    PHYSICAL EXAM  ED Triage Vitals   Temp Heart Rate Resp BP SpO2   06/02/22 1443 06/02/22 1443 06/02/22 1443 06/02/22 1700 06/02/22 1443   96.3 °F (35.7 °C) 98 16 132/82 96 %      Temp src Heart Rate Source Patient Position BP Location FiO2 (%)   06/02/22 1939 06/02/22 1443 06/02/22 1939 06/02/22 1939 --   Oral Monitor Lying Right arm          GENERAL: no acute distress  HENT: nares patent  EYES: no scleral icterus  CV: regular rhythm, normal rate  RESPIRATORY: normal effort  ABDOMEN: soft  MUSCULOSKELETAL: no deformity  NEURO: alert, moves all extremities, follows commands  PSYCH:  calm, cooperative  SKIN: warm, dry -vesicular erythematous rash left abdomen in dermatomal pattern    Vital signs and nursing notes reviewed.        Plan: Valacyclovir for  shingles.  Serial troponins.  Cardiology consult  \

## 2022-06-03 ENCOUNTER — APPOINTMENT (OUTPATIENT)
Dept: CARDIOLOGY | Facility: HOSPITAL | Age: 52
End: 2022-06-03

## 2022-06-03 ENCOUNTER — APPOINTMENT (OUTPATIENT)
Dept: NUCLEAR MEDICINE | Facility: HOSPITAL | Age: 52
End: 2022-06-03

## 2022-06-03 VITALS
OXYGEN SATURATION: 96 % | HEIGHT: 69 IN | RESPIRATION RATE: 16 BRPM | HEART RATE: 101 BPM | TEMPERATURE: 98.6 F | BODY MASS INDEX: 38.51 KG/M2 | WEIGHT: 260 LBS | SYSTOLIC BLOOD PRESSURE: 135 MMHG | DIASTOLIC BLOOD PRESSURE: 91 MMHG

## 2022-06-03 LAB
ANION GAP SERPL CALCULATED.3IONS-SCNC: 14.8 MMOL/L (ref 5–15)
BH CV STRESS BP STAGE 1: NORMAL
BH CV STRESS BP STAGE 2: NORMAL
BH CV STRESS DURATION MIN STAGE 1: 3
BH CV STRESS DURATION MIN STAGE 2: 2
BH CV STRESS DURATION SEC STAGE 1: 0
BH CV STRESS DURATION SEC STAGE 2: 16
BH CV STRESS GRADE STAGE 1: 10
BH CV STRESS GRADE STAGE 2: 12
BH CV STRESS HR STAGE 1: 154
BH CV STRESS HR STAGE 2: 167
BH CV STRESS METS STAGE 1: 5
BH CV STRESS METS STAGE 2: 7.1
BH CV STRESS PROTOCOL 1: NORMAL
BH CV STRESS RECOVERY BP: NORMAL MMHG
BH CV STRESS RECOVERY HR: 129 BPM
BH CV STRESS SPEED STAGE 1: 1.7
BH CV STRESS SPEED STAGE 2: 2.5
BH CV STRESS STAGE 1: 1
BH CV STRESS STAGE 2: 2
BUN SERPL-MCNC: 18 MG/DL (ref 6–20)
BUN/CREAT SERPL: 16.5 (ref 7–25)
CALCIUM SPEC-SCNC: 9 MG/DL (ref 8.6–10.5)
CHLORIDE SERPL-SCNC: 102 MMOL/L (ref 98–107)
CO2 SERPL-SCNC: 24.2 MMOL/L (ref 22–29)
CREAT SERPL-MCNC: 1.09 MG/DL (ref 0.76–1.27)
DEPRECATED RDW RBC AUTO: 44.1 FL (ref 37–54)
EGFRCR SERPLBLD CKD-EPI 2021: 81.2 ML/MIN/1.73
ERYTHROCYTE [DISTWIDTH] IN BLOOD BY AUTOMATED COUNT: 12.9 % (ref 12.3–15.4)
GLUCOSE BLDC GLUCOMTR-MCNC: 155 MG/DL (ref 70–130)
GLUCOSE BLDC GLUCOMTR-MCNC: 180 MG/DL (ref 70–130)
GLUCOSE SERPL-MCNC: 202 MG/DL (ref 65–99)
HCT VFR BLD AUTO: 42.7 % (ref 37.5–51)
HGB BLD-MCNC: 14.4 G/DL (ref 13–17.7)
MAGNESIUM SERPL-MCNC: 2.4 MG/DL (ref 1.6–2.6)
MAXIMAL PREDICTED HEART RATE: 167 BPM
MCH RBC QN AUTO: 31.4 PG (ref 26.6–33)
MCHC RBC AUTO-ENTMCNC: 33.7 G/DL (ref 31.5–35.7)
MCV RBC AUTO: 93.2 FL (ref 79–97)
PERCENT MAX PREDICTED HR: 100 %
PLATELET # BLD AUTO: 190 10*3/MM3 (ref 140–450)
PMV BLD AUTO: 9.5 FL (ref 6–12)
POTASSIUM SERPL-SCNC: 4.4 MMOL/L (ref 3.5–5.2)
QT INTERVAL: 340 MS
RBC # BLD AUTO: 4.58 10*6/MM3 (ref 4.14–5.8)
SODIUM SERPL-SCNC: 141 MMOL/L (ref 136–145)
STRESS BASELINE BP: NORMAL MMHG
STRESS BASELINE HR: 128 BPM
STRESS PERCENT HR: 118 %
STRESS POST ESTIMATED WORKLOAD: 7.1 METS
STRESS POST EXERCISE DUR MIN: 5 MIN
STRESS POST EXERCISE DUR SEC: 16 SEC
STRESS POST PEAK BP: NORMAL MMHG
STRESS POST PEAK HR: 167 BPM
STRESS TARGET HR: 142 BPM
TROPONIN T SERPL-MCNC: <0.01 NG/ML (ref 0–0.03)
WBC NRBC COR # BLD: 5.57 10*3/MM3 (ref 3.4–10.8)

## 2022-06-03 PROCEDURE — G0378 HOSPITAL OBSERVATION PER HR: HCPCS

## 2022-06-03 PROCEDURE — 84484 ASSAY OF TROPONIN QUANT: CPT | Performed by: NURSE PRACTITIONER

## 2022-06-03 PROCEDURE — 93017 CV STRESS TEST TRACING ONLY: CPT

## 2022-06-03 PROCEDURE — 83735 ASSAY OF MAGNESIUM: CPT | Performed by: EMERGENCY MEDICINE

## 2022-06-03 PROCEDURE — 93005 ELECTROCARDIOGRAM TRACING: CPT | Performed by: NURSE PRACTITIONER

## 2022-06-03 PROCEDURE — 82962 GLUCOSE BLOOD TEST: CPT

## 2022-06-03 PROCEDURE — 0 TECHNETIUM SESTAMIBI: Performed by: EMERGENCY MEDICINE

## 2022-06-03 PROCEDURE — 99214 OFFICE O/P EST MOD 30 MIN: CPT | Performed by: INTERNAL MEDICINE

## 2022-06-03 PROCEDURE — 85027 COMPLETE CBC AUTOMATED: CPT | Performed by: EMERGENCY MEDICINE

## 2022-06-03 PROCEDURE — A9500 TC99M SESTAMIBI: HCPCS | Performed by: EMERGENCY MEDICINE

## 2022-06-03 PROCEDURE — 93018 CV STRESS TEST I&R ONLY: CPT | Performed by: INTERNAL MEDICINE

## 2022-06-03 PROCEDURE — 80048 BASIC METABOLIC PNL TOTAL CA: CPT | Performed by: EMERGENCY MEDICINE

## 2022-06-03 PROCEDURE — 93016 CV STRESS TEST SUPVJ ONLY: CPT | Performed by: INTERNAL MEDICINE

## 2022-06-03 RX ORDER — FAMOTIDINE 10 MG/ML
20 INJECTION, SOLUTION INTRAVENOUS EVERY 12 HOURS SCHEDULED
Status: DISCONTINUED | OUTPATIENT
Start: 2022-06-03 | End: 2022-06-03 | Stop reason: HOSPADM

## 2022-06-03 RX ORDER — SUCRALFATE ORAL 1 G/10ML
1 SUSPENSION ORAL 4 TIMES DAILY
Qty: 414 ML | Refills: 1 | Status: SHIPPED | OUTPATIENT
Start: 2022-06-03

## 2022-06-03 RX ORDER — VALACYCLOVIR HYDROCHLORIDE 1 G/1
1000 TABLET, FILM COATED ORAL 3 TIMES DAILY
Qty: 20 TABLET | Refills: 0 | Status: SHIPPED | OUTPATIENT
Start: 2022-06-03 | End: 2022-06-20

## 2022-06-03 RX ORDER — ALUMINA, MAGNESIA, AND SIMETHICONE 2400; 2400; 240 MG/30ML; MG/30ML; MG/30ML
15 SUSPENSION ORAL ONCE
Status: COMPLETED | OUTPATIENT
Start: 2022-06-03 | End: 2022-06-03

## 2022-06-03 RX ADMIN — LOSARTAN POTASSIUM 100 MG: 100 TABLET, FILM COATED ORAL at 08:57

## 2022-06-03 RX ADMIN — VALACYCLOVIR HYDROCHLORIDE 1000 MG: 500 TABLET, FILM COATED ORAL at 00:42

## 2022-06-03 RX ADMIN — ALUMINUM HYDROXIDE, MAGNESIUM HYDROXIDE, AND DIMETHICONE 15 ML: 400; 400; 40 SUSPENSION ORAL at 08:58

## 2022-06-03 RX ADMIN — VALACYCLOVIR HYDROCHLORIDE 1000 MG: 500 TABLET, FILM COATED ORAL at 15:25

## 2022-06-03 RX ADMIN — TECHNETIUM TC 99M SESTAMIBI 1 DOSE: 1 INJECTION INTRAVENOUS at 10:34

## 2022-06-03 RX ADMIN — Medication 10 ML: at 08:56

## 2022-06-03 RX ADMIN — VALACYCLOVIR HYDROCHLORIDE 1000 MG: 500 TABLET, FILM COATED ORAL at 08:55

## 2022-06-03 NOTE — PLAN OF CARE
Goal Outcome Evaluation:  Plan of Care Reviewed With: patient        Progress: improving  Outcome Evaluation: Patient alert and oriented. Patient being discharged home. AVS given to patient. Discharge instructions given to patient.

## 2022-06-03 NOTE — PROGRESS NOTES
ED OBSERVATION PROGRESS/DISCHARGE SUMMARY    Date of Admission: 6/2/2022   LOS: 0 days   PCP: Real Alarcon MD      Subjective    No acute events overnight.  Reports some indigestion with belching and gas.  States he has been having a bit more pain where his ventral hernia is of the umbilicus.  He states he has had previous surgeries with mesh placement.  He also reports some shortness of breath for the past 3 days and stated his chest pain started 2 days ago.  He reports a episode of chest pain last night around 1:30 AM but states it only lasted about 45 seconds.  No further episodes.  Denies any palpitations.  Denies shortness of breath at this time.  Denies fevers and chills.  Denies nausea, vomiting, and diarrhea.    Hospital Outcome:   52-year-old male admitted to the observation unit for chest pain.  Serial troponins negative x3, and EKG nonischemic.  This x-ray showed no acute findings.  Cardiology was consulted recommended  patient to undergo treadmill stress test that was normal.  We will have patient follow-up with Deborah James NP in the office in 2 to 4 weeks.  Discussed all of the findings and plan for discharge with the patient who endorses understanding is in agreement.    ROS:  General: no fevers, chills  Respiratory: no cough, dyspnea  Cardiovascular: +Chest pain, no palpitations  Abdomen: No abdominal pain, nausea, vomiting, or diarrhea  Neurologic: No focal weakness    Objective   Physical Exam:  I have reviewed the vital signs.  Temp:  [96.3 °F (35.7 °C)-98 °F (36.7 °C)] 98 °F (36.7 °C)  Heart Rate:  [90-98] 98  Resp:  [16-18] 16  BP: (119-148)/(80-90) 119/83  General Appearance:  52-year-old male, well-nourished, no acute distress on room air  Head:    Normocephalic, atraumatic  Eyes:    Sclerae anicteric  Neck:   Supple, no mass  Lungs: Clear to auscultation bilaterally, respirations unlabored  Heart: Regular rate and rhythm, S1 and S2 normal, no murmur, rub or gallop  Abdomen:  Soft,  non-tender, bowel sounds active, nondistended  Extremities: No clubbing, cyanosis, or edema to lower extremities  Pulses:  2+ and symmetric in distal lower extremities  Skin: No rashes   Neurologic: Oriented x3, Normal strength to extremities    Results Review:    I have reviewed the labs, radiology results and diagnostic studies.    Results from last 7 days   Lab Units 06/03/22  0431   WBC 10*3/mm3 5.57   HEMOGLOBIN g/dL 14.4   HEMATOCRIT % 42.7   PLATELETS 10*3/mm3 190     Results from last 7 days   Lab Units 06/03/22  0431 06/02/22  1615   SODIUM mmol/L 141 135*   POTASSIUM mmol/L 4.4 5.1   CHLORIDE mmol/L 102 98   CO2 mmol/L 24.2 21.5*   BUN mg/dL 18 17   CREATININE mg/dL 1.09 1.24   CALCIUM mg/dL 9.0 9.8   BILIRUBIN mg/dL  --  1.4*   ALK PHOS U/L  --  84   ALT (SGPT) U/L  --  30   AST (SGOT) U/L  --  31   GLUCOSE mg/dL 202* 159*     Imaging Results (Last 24 Hours)     Procedure Component Value Units Date/Time    XR Chest 1 View [[id number]] Collected: 06/02/22 1655     Updated: 06/02/22 1659    Narrative:      XR CHEST 1 VW-  06/02/2022     HISTORY: Chest pain.     Heart size is within normal limits. Lungs are slightly underinflated but  appear clear. Bones and soft tissues are unremarkable.       Impression:      No acute process.     This report was finalized on 6/2/2022 4:55 PM by Dr. Esvin Cisneros M.D.             I have reviewed the medications.  ---------------------------------------------------------------------------------------------  Assessment & Plan   Assessment/Problem List    Chest pain      Plan:    Chest pain  -Troponin negative x3  -EKG nonischemic  -Cardiology consult, recommended treadmill stress test that was normal  -Patient to follow-up with Deborah Jacob, NP with cardiology in 2 to 4 weeks     Shingles  -Valtrex 1 g 3 times daily for 7 days total  -Patient to follow-up with his primary care provider in 1 week for resolution.    Ventral hernia  -Status post repair with mesh  x3  -Follows with Dr. Perea with [facility]  -Hernia is reducible on exam  -Follow-up with GI outpatient as needed     DM2  -Continue home medications as prescribed     Hyperlipidemia/hypertension  -Continue home medications as prescribed    Disposition: Home    Follow-up after Discharge: Cardiology, PCP    This note will serve as discharge summary.    I wore an face mask, eye protection, and gloves during this patient encounter. Patient also wearing a surgical mask. Hand hygeine performed before and after seeing the patient.    Mayda Fregoso PA-C 06/03/22 15:12 EDT

## 2022-06-03 NOTE — DISCHARGE INSTRUCTIONS
Taking Valtrex 1000 mg twice daily for 7 days total.  You should take 2 more doses today.  You have also been prescribed Carafate 10 mL suspension to take 4 times daily for acid reflux symptoms  Continue all other home medications as prescribed.  You may take over-the-counter Tylenol 650 mg every 6 hours as needed for shingles pain.  Avoid NSAIDs as they may make acid reflux symptoms worse.  Follow-up with your primary care provider in 1 to 2 weeks.  You have a follow-up appointment scheduled in 2 to 4 weeks with Deborah James with cardiology.  If you have not heard from them in 1 week please call the number provided.  Follow-up with your GI doctor on an as-needed basis for continued symptoms of acid reflux.    Return to the emergency department with worsening symptoms, uncontrolled pain, inability to tolerate oral liquids, fever greater than 101°F not controlled by Tylenol or as needed with emergent concerns.

## 2022-06-03 NOTE — CONSULTS
Patient Name: Jose Almazan  :1970  52 y.o.    Date of Admission: 2022  Date of Consultation:  22  Encounter Provider: Marti Geller RN  Place of Service: [facility] CARDIOLOG[facility]  Referring Provider: No ref. provider found  Patient Care Team:  Real Alarcon MD as PCP - General (Internal Medicine)      Chief complaint: Chest pain     Reason for Consult: Chest pain     History of Present Illness:    Mr Almazan is a 52 year old retired  with a history of hypertension, hyperlipidemia, diabetes, and GURDEEP (CPAP).  He is normally followed by Dr Dalton.     2019-He complained of SOA  which showed no ischemia and normal EF.  He underwent cardiac catheterization in  which showed normal coronary arteries.     -He was seen in the ED in 2020 for chest pain and negative troponins.  He had another stress ECHO which again showed no ischemia, EF 63%, and mild MR.     He phoned the office yesterday to let us know he developed chest pain with SOA while mowing the grass and he was headed to the ED. To be clear, this was a riding lawn  Mower requiring no physicial exertion according to the patient. He describes a squeezing left sided pain. For the last 3 days he has had increased belching. He has a large abdominal hernia which has grown over the last several months. There is no operative plans for the hernia due to his obesity and that his would be a 3rd operation for it.   He has shingles which developed overnight. He has left sided electrical burning pain in that distribution which is different than his chest squeezing described above.     EKG is normal. Troponins are normal. No further pain this morning. Lots of gas this morning and bowel sounds audible from across the rom    Previous Cardiac Testing    Stress ECHO 2020  Interpretation Summary    · Normal stress echo with no significant echocardiographic evidence for myocardial ischemia  consistent with a low risk study for myocardial ischemia.  · Calculated left ventricular EF = 63%  · Left ventricular wall thickness is consistent with mild concentric hypertrophy.  · Left ventricular diastolic function is consistent with (grade I) impaired relaxation.  · Normal right ventricular cavity size and systolic function noted.  · The left atrial cavity is borderline dilated.  · Mild mitral valve regurgitation is present.  · There is no evidence of pericardial effusion    Cardiac Catheterization 3/18/2019  Cineangiography:  1.  Left main: The left main coronary artery is long and normal.  2.  LAD: The left anterior descending coronary artery has minor plaquing in the mid segment with a stenosis of approximately 10-20%.  The remainder of the vessel and diagonal branches are normal.  3.  Ramus intermedius: There is a medium to large caliber ramus branch arising from the left main that is normal.  4.  LCx: The left circumflex coronary artery and major obtuse marginal branches are normal.  5.  RCA: The right coronary artery is a large dominant vessel.  The right coronary artery posterior descending and posterior lateral branches are normal.     Assessment:  1.  Normal coronary arteries          Past Medical History:   Diagnosis Date   • Asthma    • Diabetes mellitus (HCC)    • GERD (gastroesophageal reflux disease)    • Hyperlipidemia    • Hypertension    • Kidney stones    • Morbid obesity (HCC)    • GURDEEP on CPAP        Past Surgical History:   Procedure Laterality Date   • ABSCESS DRAINAGE N/A     rectal abscess   • CARDIAC CATHETERIZATION N/A 3/18/2019    Procedure: Left Heart Cath;  Surgeon: Nikolai Bolanos MD;  Location: [facility] INVASIVE LOCATION;  Service: Cardiology   • CHOLECYSTECTOMY  2008   • HERNIA MESH REMOVAL  12/20/2018    removal of infected mesh & hernia repair   • INCISIONAL HERNIA REPAIR  08/29/2016    LAPAROSCOPIC REPAIR OF INCARCERATED INCISIONAL HERNIA CAUSING OBSTRUCTION WITH  DUALMESH PLUS   • INCISIONAL HERNIA REPAIR N/A 2019   • KIDNEY STONE SURGERY     • LASIK     • TONSILLECTOMY           Prior to Admission medications    Medication Sig Start Date End Date Taking? Authorizing Provider   aspirin 325 MG tablet Take 325 mg by mouth Daily.   Yes Nan Vera MD   celecoxib (CeleBREX) 200 MG capsule Take 200 mg by mouth Daily. 20  Yes Nan Vera MD   celecoxib (CeleBREX) 200 MG capsule Take 1 capsule by mouth Daily. 22  Yes Nan Vera MD   Insulin Lispro (HUMALOG SC) Inject 16 Units under the skin into the appropriate area as directed 3 (Three) Times a Day Before Meals.   Yes Nan Vera MD   irbesartan (AVAPRO) 300 MG tablet Take 300 mg by mouth Daily. 20  Yes Nan Vera MD   Liraglutide (VICTOZA) 18 MG/3ML solution pen-injector injection Inject 1.8 mg under the skin. 3/27/18  Yes Nan Vera MD   metFORMIN ER (GLUCOPHAGE-XR) 500 MG 24 hr tablet Take 2,000 mg by mouth. 3/30/18  Yes Nan Vera MD   omeprazole (priLOSEC) 40 MG capsule Take 40 mg by mouth Daily.   Yes Nan Vera MD   ROSUVASTATIN CALCIUM PO Take 20 mg by mouth Daily.   Yes Nan Vera MD   glucose blood test strip 1 strip by Other route. 4/10/18   Nan Vera MD       No Known Allergies    Social History     Socioeconomic History   • Marital status:    Tobacco Use   • Smoking status: Never Smoker   • Smokeless tobacco: Never Used   • Tobacco comment: caffeine use- diet coke, tea   Substance and Sexual Activity   • Alcohol use: Yes     Comment: Socially.  3-4 beers per week on average.   • Drug use: No   • Sexual activity: Defer       Family History   Problem Relation Age of Onset   • Heart disease Mother         Mother  from MI and perforated coronary artery during cath   • Heart disease Father         Father with 4 vessel CABG in his late 40's.  Also with 2 MI's.   • Diabetes Father     • Down syndrome Sister    • Heart disease Sister    • Coronary artery disease Paternal Grandfather         Paternal GF with 3 MI's       REVIEW OF SYSTEMS:   All systems reviewed.  Pertinent positives identified in HPI.  All other systems are negative.      Objective:     Vitals:    06/02/22 1909 06/02/22 1939 06/02/22 2224 06/03/22 0430   BP: 122/80 137/86 148/90 119/83   BP Location:  Right arm Right arm Right arm   Patient Position:  Lying Lying Lying   Pulse: 92 90 98 98   Resp: 18 18 16 16   Temp:  97.9 °F (36.6 °C) 97.9 °F (36.6 °C) 98 °F (36.7 °C)   TempSrc:  Oral Oral Oral   SpO2: 95% 97% 97% 97%   Weight:       Height:         Body mass index is 38.4 kg/m².    GEN: no distress, alert and oriented  Eyes: normal sclerae, normal lids and lashes  HENT: moist mucous membranes,   Lungs: CTAB, no rales or wheezes  Chest: no abnormalities  Neck: no JVD or carotid bruits  CV: RRR, no murmurs, +2 DP and 2+ carotid pulses b/l  Abdomen: soft, nontender, nondistended  Extremities: no edema  Skin: no rash, warm, dry  Heme/Lymph: no bruising  Psych: organized thought, normal behavior and affect  ab Review:     Results from last 7 days   Lab Units 06/03/22  0431 06/02/22  1615   SODIUM mmol/L 141 135*   POTASSIUM mmol/L 4.4 5.1   CHLORIDE mmol/L 102 98   CO2 mmol/L 24.2 21.5*   BUN mg/dL 18 17   CREATININE mg/dL 1.09 1.24   CALCIUM mg/dL 9.0 9.8   BILIRUBIN mg/dL  --  1.4*   ALK PHOS U/L  --  84   ALT (SGPT) U/L  --  30   AST (SGOT) U/L  --  31   GLUCOSE mg/dL 202* 159*     Results from last 7 days   Lab Units 06/03/22  0431 06/02/22 2012 06/02/22  1615   TROPONIN T ng/mL <0.010 <0.010 <0.010     Results from last 7 days   Lab Units 06/03/22  0431   WBC 10*3/mm3 5.57   HEMOGLOBIN g/dL 14.4   HEMATOCRIT % 42.7   PLATELETS 10*3/mm3 190         Results from last 7 days   Lab Units 06/03/22  0431   MAGNESIUM mg/dL 2.4     Results from last 7 days   Lab Units 06/02/22  1615   CHOLESTEROL mg/dL 123   TRIGLYCERIDES mg/dL 167*    HDL CHOL mg/dL 39*   LDL CHOL mg/dL 56             EKG this AM      EKG on arrival       EKG baseline      I personally viewed and interpreted the patient's EKG/Telemetry data.        Assessment and Plan:       1. Chest pressure and belching. I think this is most likely GI in origin, however he has a strong family history of CAD and is himself a diabetic. Cardiac cath in 2019 was unremarkable. Given his abdominal hernia and obesity a nuclear stress would likely show inferior attenuation. He has shingles across his left chest which makes stress echo impossible as there are still vesicles and pain with palpation. Will plan standard treadmill stress test and if normal he can be discharged with fu in our office with his cardiologist dr page.     Aura Barry MD  [facility]  06/03/22

## 2022-06-03 NOTE — H&P
".   [facility]   HISTORY AND PHYSICAL    Patient Name: Jose Almazan  : 1970  MRN: [id number]  Primary Care Physician:  Real Alarcon MD  Date of admission: 2022    Subjective   Subjective     Chief Complaint: Chest pain    HPI:    Jose Almazan is a 52 y.o. male with past medical history including but not limited to asthma, DM 2, GERD, hypertension, hyperlipidemia, and morbid obesity presents to [facility] with chest pain.  Patient reports ongoing intermittent chest pain for the past 2 days that he describes as pressure and\" squeezing-like sensation\".  Pain is nonradiating but associated with dyspnea.  Denies any nausea, vomiting, diaphoresis, or back pain.  Patient reports his pain has been more constant today and only last for about 4 to 10 minutes at a time.  Currently patient denies any chest pain or discomfort.  Patient reports constant\" belching for the past 3 to 5 days.  Denies palpitation, dyspnea, or abdominal pain.  Denies nausea, vomiting, or diarrhea.  Denies cough, chills, or lower extremity edema.  Denies dysuria, hematuria, or increasing frequency/urgency.    Review of Systems   All systems were reviewed and negative except for: All systems were reviewed and negative except for the mention above in HPI    Personal History     Past Medical History:   Diagnosis Date   • Asthma    • Diabetes mellitus (HCC)    • GERD (gastroesophageal reflux disease)    • Hyperlipidemia    • Hypertension    • Kidney stones    • Morbid obesity (HCC)    • GURDEEP on CPAP        Past Surgical History:   Procedure Laterality Date   • ABSCESS DRAINAGE N/A     rectal abscess   • CARDIAC CATHETERIZATION N/A 3/18/2019    Procedure: Left Heart Cath;  Surgeon: Nikolai Bolanos MD;  Location: [facility] CATH INVASIVE LOC[facility];  Service: Cardiology   • CHOLECYSTECTOMY     • HERNIA MESH REMOVAL  2018    removal of infected mesh & hernia repair   • INCISIONAL HERNIA REPAIR  2016    " LAPAROSCOPIC REPAIR OF INCARCERATED INCISIONAL HERNIA CAUSING OBSTRUCTION WITH DUALMESH PLUS   • INCISIONAL HERNIA REPAIR N/A 08/12/2019   • KIDNEY STONE SURGERY     • LASIK     • TONSILLECTOMY         Family History: family history includes Coronary artery disease in his paternal grandfather; Diabetes in his father; Down syndrome in his sister; Heart disease in his father, mother, and sister. Otherwise pertinent FHx was reviewed and not pertinent to current issue.    Social History:  reports that he has never smoked. He has never used smokeless tobacco. He reports current alcohol use. He reports that he does not use drugs.    Home Medications:  Insulin Lispro, Liraglutide, Rosuvastatin Calcium, aspirin, celecoxib, glucose blood, irbesartan, metFORMIN ER, and omeprazole    Allergies:  No Known Allergies    Objective   Objective     Vitals:   Temp:  [96.3 °F (35.7 °C)-97.9 °F (36.6 °C)] 97.9 °F (36.6 °C)  Heart Rate:  [90-98] 90  Resp:  [16-18] 18  BP: (122-137)/(80-86) 137/86  Physical Exam    Constitutional: Awake, alert, no acute distress   Eyes: PERRLA, sclerae anicteric, no conjunctival injection   HENT: NCAT, mucous membranes moist   Neck: Supple, no thyromegaly, no lymphadenopathy, trachea midline   Respiratory: Clear to auscultation bilaterally, nonlabored respirations no wheezing or stridor   Cardiovascular: RRR, S1 and S2, no S3 or S4, no murmurs, rubs, or gallops, palpable pedal pulses bilaterally   Gastrointestinal: Positive bowel sounds, obese, ventral hernia, soft, nontender, nondistended   Musculoskeletal: No bilateral ankle edema, no clubbing or cyanosis to extremities   Psychiatric: Appropriate affect, cooperative   Neurologic: Oriented x 3, strength symmetric in all extremities, Cranial Nerves grossly intact to confrontation, speech clear   Skin: Vesicular rash in dermatomal distribution at approximately T8 level of the left side of the abdomen    Result Review    Result Review:  I have personally  reviewed the results from the time of this admission to 6/2/2022 20:09 EDT and agree with these findings:  [x]  Laboratory  []  Microbiology  [x]  Radiology  []  EKG/Telemetry   []  Cardiology/Vascular   []  Pathology  []  Old records  []  Other:  Most notable findings include:  Troponin<0.010, glucose 159, sodium 135, CO2 21.5, anion gap 15.5, total bilirubin 1.4, H DL 39, triglyceride 167, lipase 137, WBC 7.18, hemoglobin 15.3, and platelets 199  Chest x-ray negative for any acute findings  Assessment & Plan   Assessment / Plan     Brief Patient Summary:  Jose Almazan is a 52 y.o. male who seen and evaluated the ED for chest pain.  Patient is being admitted to observation unit for further evaluation cardiology consult.    Active Hospital Problems:  Active Hospital Problems    Diagnosis    • Chest pain      Plan:   Chest pain  -Initial repeat troponin negative  -EKG nonischemic  -Cardiology consult  -Repeat EKG and troponin  -Telemetry monitoring    Shingles  -Valtrex 1 g 3 times daily for 7 days  -Contact and droplet precaution    DM2  -Accu-Chek before meals and at bedtime  -Moderate insulin sliding scale    Hyperlipidemia/hypertension  -Continue medications  -Vital signs per nursing    DVT prophylaxis:  Mechanical DVT prophylaxis orders are present.    CODE STATUS:    Code Status (Patient has no pulse and is not breathing): CPR (Attempt to Resuscitate)  Medical Interventions (Patient has pulse or is breathing): Full Support    Admission Status:  I believe this patient meets probation status.    I wore an face mask, eye protection, and gloves during this patient encounter. Patient also wearing a surgical mask. Hand hygeine performed before and after seeing the patient.    Electronically signed by JOSE Jacobs, 06/02/22, 8:09 PM EDT.

## 2022-06-03 NOTE — PLAN OF CARE
Goal Outcome Evaluation:   Plan of care reviewed with patient   Outcome summary: Pt admitted to observation unit for chest pain, pt reports no chest pain during shift, Pt was found to have shingles present to the left side in ED. AOX4, up without assistance, plan for cardiology to see this AM.

## 2022-06-20 ENCOUNTER — OFFICE VISIT (OUTPATIENT)
Dept: CARDIOLOGY | Facility: CLINIC | Age: 52
End: 2022-06-20

## 2022-06-20 VITALS
DIASTOLIC BLOOD PRESSURE: 82 MMHG | HEART RATE: 91 BPM | WEIGHT: 263 LBS | BODY MASS INDEX: 38.95 KG/M2 | SYSTOLIC BLOOD PRESSURE: 136 MMHG | OXYGEN SATURATION: 97 % | HEIGHT: 69 IN

## 2022-06-20 DIAGNOSIS — E11.69 DIABETES MELLITUS TYPE 2 IN OBESE: ICD-10-CM

## 2022-06-20 DIAGNOSIS — E66.9 DIABETES MELLITUS TYPE 2 IN OBESE: ICD-10-CM

## 2022-06-20 DIAGNOSIS — G47.33 OSA (OBSTRUCTIVE SLEEP APNEA): ICD-10-CM

## 2022-06-20 DIAGNOSIS — R07.89 CHEST PAIN, ATYPICAL: Primary | ICD-10-CM

## 2022-06-20 DIAGNOSIS — E78.00 HYPERCHOLESTEROLEMIA: ICD-10-CM

## 2022-06-20 PROCEDURE — 99214 OFFICE O/P EST MOD 30 MIN: CPT | Performed by: NURSE PRACTITIONER

## 2022-06-20 RX ORDER — GLIMEPIRIDE 4 MG/1
1 TABLET ORAL 2 TIMES DAILY
COMMUNITY
Start: 2022-04-29

## 2022-06-20 NOTE — PROGRESS NOTES
"    CARDIOLOGY        Patient Name: Jose Almazan  :1970  Age: 52 y.o.  Primary Cardiologist: Rosales Dalton MD  Encounter Provider:  JOSE Magdaleno    Date of Service: 22          CHIEF COMPLAINT / REASON FOR OFFICE VISIT     Chest Pain ([facility] 1-2 wk f/u/)      HISTORY OF PRESENT ILLNESS       HPI  Jose Almazan is a 52 y.o. male who presents today for 1-2-week reevaluation.     Pt has a  history significant for hyperlipidemia, GURDEEP, obesity, type 2 diabetes.    Review of medical records reveals patient was evaluated in the emergency department 2022 for episode of 2-hour chest pain.  Patient is ECG was reviewed personally by me and was found to be nonischemic.  Laboratory studies were reviewed which revealed negative cardiac enzymes.  Patient was admitted to the observation unit he had a treadmill stress test which was negative for findings of ischemia.  He was diagnosed with shingles.    Patient states that since his ED visit, he has not had further episodes of chest pain or discomfort. He reports that he know thinks that the pain was associated with shingles.  He is asymptomatic and denies any episodes of chest pain, shortness of breath, palpitations, lightheadedness, swelling or fatigue.  He does limited exercise.  He does cut grass and sometimes with a push mower, he does not have limiting symptoms.      The following portions of the patient's history were reviewed and updated as appropriate: allergies, current medications, past family history, past medical history, past social history, past surgical history and problem list.      VITAL SIGNS     Visit Vitals  /82 (BP Location: Right arm, Patient Position: Sitting, Cuff Size: Adult)   Pulse 91   Ht 175.3 cm (69\")   Wt 119 kg (263 lb)   SpO2 97%   BMI 38.84 kg/m²         Wt Readings from Last 3 Encounters:   22 119 kg (263 lb)   22 118 kg (260 lb)   20 122 kg (270 lb)     Body mass index is 38.84 " kg/m².      REVIEW OF SYSTEMS   Review of Systems   Constitutional: Negative for malaise/fatigue.   Cardiovascular: Negative for chest pain and leg swelling.   Respiratory: Negative for shortness of breath.    Neurological: Negative for light-headedness.   All other systems reviewed and are negative.          PHYSICAL EXAMINATION     Constitutional:       Appearance: Normal appearance. Well-developed.   Eyes:      Conjunctiva/sclera: Conjunctivae normal.   Neck:      Vascular: No carotid bruit.   Pulmonary:      Effort: Pulmonary effort is normal.      Breath sounds: Normal breath sounds.   Cardiovascular:      Normal rate. Regular rhythm. Normal S1. Normal S2.      Murmurs: There is no murmur.      No gallop. No click. No rub.   Musculoskeletal: Normal range of motion. Skin:     General: Skin is warm and dry.   Neurological:      Mental Status: Alert and oriented to person, place, and time.      GCS: GCS eye subscore is 4. GCS verbal subscore is 5. GCS motor subscore is 6.   Psychiatric:         Speech: Speech normal.         Behavior: Behavior normal.         Thought Content: Thought content normal.         Judgment: Judgment normal.           REVIEWED DATA     Procedures    Cardiac Procedures:  1. Cardiac catheterization 3/18/2019.  Normal coronary arteries.  2. Stress echocardiogram 12/24/2020.  No evidence of myocardial ischemia.  LVEF 63%.  Grade 1 diastolic dysfunction.  Mitral valve regurgitation is mild.  3. Treadmill stress test 6/30/2022.  Normal ECG stress test.    Lipid Panel    Lipid Panel 6/2/22   Total Cholesterol 123   Triglycerides 167 (A)   HDL Cholesterol 39 (A)   VLDL Cholesterol 28   LDL Cholesterol  56   LDL/HDL Ratio 1.30   (A) Abnormal value            BUN   Date Value Ref Range Status   06/14/2022 14 8 - 26 mg/dL Final     Creatinine   Date Value Ref Range Status   06/14/2022 1.02 0.73 - 1.18 mg/dL Final     Potassium   Date Value Ref Range Status   06/14/2022 4.4 3.5 - 5.1 mmol/L Final      ALT (SGPT)   Date Value Ref Range Status   06/14/2022 35 10 - 50 U/L Final     AST (SGOT)   Date Value Ref Range Status   06/14/2022 29 10 - 50 U/L Final           ASSESSMENT & PLAN      Diagnosis Plan   1. Chest pain, atypical     2. Hypercholesterolemia     3. GURDEEP (obstructive sleep apnea)     4. Diabetes mellitus type 2 in obese (HCC)           SUMMARY/DISCUSSION  1. Atypical chest pain.  Patient with recent observation admission for chest pain.  Pain now thought to be associated with an active shingles infection.  Patient reports since observation admission he has not had any further episodes of chest discomfort.  Patient had a cardiac catheterization in 2019 which revealed normal coronary arteries.  During observation admission he had a treadmill stress test which was negative for ischemia.  He also had cardiac biomarkers negative x2.  2. Hyperlipidemia.  Reviewed most recent lipid panel as documented above where LDL is controlled.  Continue rosuvastatin 20 mg/day.  3. GURDEEP on CPAP  4. Type 2 diabetes.  Monitor closely by patient and PCP.  5. Patient will transition care to Dr. Barry from Dr. Dalton.  Follow-up in 6 months.        MEDICATIONS         Discharge Medications          Accurate as of June 20, 2022  9:47 AM. If you have any questions, ask your nurse or doctor.            Continue These Medications      Instructions Start Date   aspirin 325 MG tablet   325 mg, Oral, Daily      celecoxib 200 MG capsule  Commonly known as: CeleBREX   1 capsule, Oral, Daily      glimepiride 4 MG tablet  Commonly known as: AMARYL   1 tablet, Oral, 2 Times Daily      glucose blood test strip   1 strip, Other      HUMALOG SC   16 Units, Subcutaneous, 3 Times Daily Before Meals      irbesartan 300 MG tablet  Commonly known as: AVAPRO   300 mg, Oral, Daily      Liraglutide 18 MG/3ML solution pen-injector injection  Commonly known as: VICTOZA   1.8 mg, Subcutaneous      metFORMIN  MG 24 hr tablet  Commonly known  as: GLUCOPHAGE-XR   2,000 mg, Oral      omeprazole 40 MG capsule  Commonly known as: priLOSEC   40 mg, Oral, Daily      ROSUVASTATIN CALCIUM PO   20 mg, Oral, Daily      sucralfate 1 GM/10ML suspension  Commonly known as: Carafate   1 g, Oral, 4 Times Daily         Stop These Medications    valACYclovir 1000 MG tablet  Commonly known as: VALTREX  Stopped by: JOSE Magdaleno                **Dragon Disclaimer:   Much of this encounter note is an electronic transcription/translation of spoken language to printed text. The electronic translation of spoken language may permit erroneous, or at times, nonsensical words or phrases to be inadvertently transcribed. Although I have reviewed the note for such errors, some may still exist.

## 2022-07-12 ENCOUNTER — OFFICE VISIT (OUTPATIENT)
Dept: GASTROENTEROLOGY | Facility: CLINIC | Age: 52
End: 2022-07-12

## 2022-07-12 VITALS
TEMPERATURE: 96.8 F | BODY MASS INDEX: 39.65 KG/M2 | SYSTOLIC BLOOD PRESSURE: 135 MMHG | WEIGHT: 267.7 LBS | DIASTOLIC BLOOD PRESSURE: 86 MMHG | HEIGHT: 69 IN

## 2022-07-12 DIAGNOSIS — K43.0 RECURRENT INCISIONAL HERNIA WITH INCARCERATION: ICD-10-CM

## 2022-07-12 DIAGNOSIS — K21.9 GASTROESOPHAGEAL REFLUX DISEASE, UNSPECIFIED WHETHER ESOPHAGITIS PRESENT: ICD-10-CM

## 2022-07-12 DIAGNOSIS — K59.00 CONSTIPATION, UNSPECIFIED CONSTIPATION TYPE: ICD-10-CM

## 2022-07-12 DIAGNOSIS — R07.2 PRECORDIAL PAIN: Primary | ICD-10-CM

## 2022-07-12 PROCEDURE — 99204 OFFICE O/P NEW MOD 45 MIN: CPT | Performed by: INTERNAL MEDICINE

## 2022-07-12 RX ORDER — SODIUM CHLORIDE, SODIUM LACTATE, POTASSIUM CHLORIDE, CALCIUM CHLORIDE 600; 310; 30; 20 MG/100ML; MG/100ML; MG/100ML; MG/100ML
30 INJECTION, SOLUTION INTRAVENOUS CONTINUOUS
Status: CANCELLED | OUTPATIENT
Start: 2022-10-19

## 2022-07-12 NOTE — PROGRESS NOTES
Chief Complaint   Patient presents with   • Heartburn   • Constipation   • Difficulty Swallowing     Subjective   HPI  Jose Almazan is a 52 y.o. male who presents today for new patient evaluation.  He complains for indigestion and constipation.      5-6 weeks ago - chest pain.  Negative cardiac workup.  Eventually felt to be secondary to shingles.  Some indigestion around that time as well but better.   Abdominal hernia with 3 repairs - previously saw Dr Perea, told to lose weight before he could operate    Intermittent constipation.  Last colonoscopy 2016 NAD.  Prior CT A/p from 2019 showed constipation.  Does not take any regular laxatives.     Objective   Vitals:    07/12/22 1426   BP: 135/86   Temp: 96.8 °F (36 °C)     Physical Exam  Vitals reviewed.   Constitutional:       Appearance: He is well-developed.   HENT:      Head: Normocephalic and atraumatic.   Abdominal:      General: Bowel sounds are normal. There is no distension.      Palpations: Abdomen is soft. There is no mass.      Tenderness: There is no abdominal tenderness.      Hernia: A hernia is present.      Comments: Extremely large abdominal hernia   Skin:     General: Skin is warm and dry.   Neurological:      Mental Status: He is alert and oriented to person, place, and time.   Psychiatric:         Behavior: Behavior normal.         Thought Content: Thought content normal.         Judgment: Judgment normal.              Assessment & Plan   Assessment:     1. Precordial pain    2. Constipation, unspecified constipation type    3. Gastroesophageal reflux disease, unspecified whether esophagitis present    4. Recurrent incisional hernia with incarceration      Plan:   Will schedule EGD to evaluate patient's recent episode of noncardiac chest pain in the setting of worsening indigestion.  He also reports some intermittent constipation I think we should try to perform a colonoscopy although this may be technically difficult given the extremely  large abdominal wall hernia noted on examination today.  This continues to be problematic for the patient but he is been told previously he could not have surgical repair until he lost weight and has not made much effort to do that to date.  I would recommend that he consider following up with his general surgeon for reassessment.          Gasper Xie M.D.  [facility]  [address] [place] [address]  Office: [phone]

## 2022-07-25 ENCOUNTER — TELEPHONE (OUTPATIENT)
Dept: GASTROENTEROLOGY | Facility: CLINIC | Age: 52
End: 2022-07-25

## 2022-09-15 PROBLEM — K59.00 CONSTIPATION: Status: ACTIVE | Noted: 2022-09-15

## 2022-10-18 ENCOUNTER — TELEPHONE (OUTPATIENT)
Dept: GASTROENTEROLOGY | Facility: CLINIC | Age: 52
End: 2022-10-18

## 2022-10-18 NOTE — TELEPHONE ENCOUNTER
Caller: Jose Almazan    Relationship to patient: Self    Best call back number: [phone]    Patient is needing: PT HAS QUESTIONS REGARDING THE PREP INSTRUCTIONS. ONE QUESTION WAS WHAT DO THEY MEAN WHEN SODA CAN BE CONSUMED. QUESTION ABOUT THE MIRALAX.

## 2022-10-18 NOTE — TELEPHONE ENCOUNTER
Called pt, he had a question regarding whether he could have diet coke. Advised it is ok.  Pt verbalized understanding.

## 2022-10-19 ENCOUNTER — ANESTHESIA (OUTPATIENT)
Dept: GASTROENTEROLOGY | Facility: HOSPITAL | Age: 52
End: 2022-10-19

## 2022-10-19 ENCOUNTER — HOSPITAL ENCOUNTER (OUTPATIENT)
Facility: HOSPITAL | Age: 52
Setting detail: HOSPITAL OUTPATIENT SURGERY
Discharge: HOME | End: 2022-10-19
Attending: INTERNAL MEDICINE | Admitting: INTERNAL MEDICINE

## 2022-10-19 ENCOUNTER — ANESTHESIA EVENT (OUTPATIENT)
Dept: GASTROENTEROLOGY | Facility: HOSPITAL | Age: 52
End: 2022-10-19

## 2022-10-19 VITALS
SYSTOLIC BLOOD PRESSURE: 147 MMHG | HEIGHT: 69 IN | WEIGHT: 265.2 LBS | RESPIRATION RATE: 16 BRPM | BODY MASS INDEX: 39.28 KG/M2 | DIASTOLIC BLOOD PRESSURE: 99 MMHG | TEMPERATURE: 97.3 F | HEART RATE: 93 BPM | OXYGEN SATURATION: 96 %

## 2022-10-19 DIAGNOSIS — R07.2 PRECORDIAL PAIN: ICD-10-CM

## 2022-10-19 DIAGNOSIS — K59.00 CONSTIPATION, UNSPECIFIED CONSTIPATION TYPE: ICD-10-CM

## 2022-10-19 LAB — GLUCOSE BLDC GLUCOMTR-MCNC: 126 MG/DL (ref 70–130)

## 2022-10-19 PROCEDURE — 88305 TISSUE EXAM BY PATHOLOGIST: CPT | Performed by: INTERNAL MEDICINE

## 2022-10-19 PROCEDURE — 43239 EGD BIOPSY SINGLE/MULTIPLE: CPT | Performed by: INTERNAL MEDICINE

## 2022-10-19 PROCEDURE — 25010000002 PROPOFOL 10 MG/ML EMULSION: Performed by: ANESTHESIOLOGY

## 2022-10-19 PROCEDURE — 82962 GLUCOSE BLOOD TEST: CPT

## 2022-10-19 PROCEDURE — 45378 DIAGNOSTIC COLONOSCOPY: CPT | Performed by: INTERNAL MEDICINE

## 2022-10-19 RX ORDER — SODIUM CHLORIDE 0.9 % (FLUSH) 0.9 %
10 SYRINGE (ML) INJECTION AS NEEDED
Status: DISCONTINUED | OUTPATIENT
Start: 2022-10-19 | End: 2022-10-19 | Stop reason: HOSPADM

## 2022-10-19 RX ORDER — ONDANSETRON 2 MG/ML
4 INJECTION INTRAMUSCULAR; INTRAVENOUS ONCE AS NEEDED
Status: DISCONTINUED | OUTPATIENT
Start: 2022-10-19 | End: 2022-10-19 | Stop reason: HOSPADM

## 2022-10-19 RX ORDER — PROPOFOL 10 MG/ML
VIAL (ML) INTRAVENOUS AS NEEDED
Status: DISCONTINUED | OUTPATIENT
Start: 2022-10-19 | End: 2022-10-19 | Stop reason: SURG

## 2022-10-19 RX ORDER — PROPOFOL 10 MG/ML
VIAL (ML) INTRAVENOUS CONTINUOUS PRN
Status: DISCONTINUED | OUTPATIENT
Start: 2022-10-19 | End: 2022-10-19 | Stop reason: SURG

## 2022-10-19 RX ORDER — LIDOCAINE HYDROCHLORIDE 20 MG/ML
INJECTION, SOLUTION INFILTRATION; PERINEURAL AS NEEDED
Status: DISCONTINUED | OUTPATIENT
Start: 2022-10-19 | End: 2022-10-19 | Stop reason: SURG

## 2022-10-19 RX ORDER — SODIUM CHLORIDE 0.9 % (FLUSH) 0.9 %
10 SYRINGE (ML) INJECTION EVERY 12 HOURS SCHEDULED
Status: DISCONTINUED | OUTPATIENT
Start: 2022-10-19 | End: 2022-10-19 | Stop reason: HOSPADM

## 2022-10-19 RX ORDER — SODIUM CHLORIDE, SODIUM LACTATE, POTASSIUM CHLORIDE, CALCIUM CHLORIDE 600; 310; 30; 20 MG/100ML; MG/100ML; MG/100ML; MG/100ML
30 INJECTION, SOLUTION INTRAVENOUS CONTINUOUS
Status: DISCONTINUED | OUTPATIENT
Start: 2022-10-19 | End: 2022-10-19 | Stop reason: HOSPADM

## 2022-10-19 RX ADMIN — PROPOFOL 40 MG: 10 INJECTION, EMULSION INTRAVENOUS at 16:05

## 2022-10-19 RX ADMIN — Medication 180 MCG/KG/MIN: at 16:03

## 2022-10-19 RX ADMIN — LIDOCAINE HYDROCHLORIDE 50 MG: 20 INJECTION, SOLUTION INFILTRATION; PERINEURAL at 16:02

## 2022-10-19 RX ADMIN — PROPOFOL 20 MG: 10 INJECTION, EMULSION INTRAVENOUS at 16:07

## 2022-10-19 RX ADMIN — PROPOFOL 100 MG: 10 INJECTION, EMULSION INTRAVENOUS at 16:03

## 2022-10-19 RX ADMIN — SODIUM CHLORIDE, POTASSIUM CHLORIDE, SODIUM LACTATE AND CALCIUM CHLORIDE 30 ML/HR: 600; 310; 30; 20 INJECTION, SOLUTION INTRAVENOUS at 15:09

## 2022-10-19 NOTE — H&P
[facility]  Pre Procedure History & Physical    Chief Complaint:   Indigestion  Constipation    Subjective     HPI:   52 y.o. male here for EGD/colonoscopy for above noted symptoms.     Past Medical History:   Past Medical History:   Diagnosis Date   • Asthma    • Diabetes mellitus (HCC)    • GERD (gastroesophageal reflux disease)    • Hyperlipidemia    • Hypertension    • Kidney stones    • Morbid obesity (HCC)    • GURDEEP on CPAP        Past Surgical History:  Past Surgical History:   Procedure Laterality Date   • ABSCESS DRAINAGE N/A     rectal abscess   • CARDIAC CATHETERIZATION N/A 2019    Procedure: Left Heart Cath;  Surgeon: Nikolai Bolanos MD;  Location: [facility] CATH INVASIVE LOCATION;  Service: Cardiology   • CHOLECYSTECTOMY     • COLONOSCOPY     • HERNIA MESH REMOVAL  2018    removal of infected mesh & hernia repair   • INCISIONAL HERNIA REPAIR  2016    LAPAROSCOPIC REPAIR OF INCARCERATED INCISIONAL HERNIA CAUSING OBSTRUCTION WITH DUALMESH PLUS   • INCISIONAL HERNIA REPAIR N/A 2019   • KIDNEY STONE SURGERY     • LASIK     • TONSILLECTOMY     • UPPER GASTROINTESTINAL ENDOSCOPY         Family History:  Family History   Problem Relation Age of Onset   • Heart disease Mother         Mother  from MI and perforated coronary artery during cath   • Heart disease Father         Father with 4 vessel CABG in his late 40's.  Also with 2 MI's.   • Diabetes Father    • Down syndrome Sister    • Heart disease Sister    • Coronary artery disease Paternal Grandfather         Paternal GF with 3 MI's       Social History:   reports that he has never smoked. He has never used smokeless tobacco. He reports current alcohol use. He reports that he does not use drugs.    Medications:   Medications Prior to Admission   Medication Sig Dispense Refill Last Dose   • aspirin 325 MG tablet Take 325 mg by mouth Daily.      • celecoxib (CeleBREX) 200 MG capsule Take 1 capsule  by mouth Daily.      • glimepiride (AMARYL) 4 MG tablet Take 1 tablet by mouth 2 (Two) Times a Day.      • glucose blood test strip 1 strip by Other route.      • Insulin Lispro (HUMALOG SC) Inject 16 Units under the skin into the appropriate area as directed 3 (Three) Times a Day Before Meals.      • irbesartan (AVAPRO) 300 MG tablet Take 300 mg by mouth Daily.      • Liraglutide (VICTOZA) 18 MG/3ML solution pen-injector injection Inject 1.8 mg under the skin.      • metFORMIN ER (GLUCOPHAGE-XR) 500 MG 24 hr tablet Take 2,000 mg by mouth.      • omeprazole (priLOSEC) 40 MG capsule Take 40 mg by mouth Daily.      • ROSUVASTATIN CALCIUM PO Take 20 mg by mouth Daily.      • sucralfate (Carafate) 1 GM/10ML suspension Take 10 mL by mouth 4 (Four) Times a Day. 414 mL 1        Allergies:  Patient has no known allergies.    ROS:    Pertinent items are noted in HPI     Objective     There were no vitals taken for this visit.    Physical Exam   Constitutional: Pt is oriented to person, place, and time and well-developed, well-nourished, and in no distress.   Mouth/Throat: Oropharynx is clear and moist.   Neck: Normal range of motion.   Cardiovascular: Normal rate, regular rhythm and normal heart sounds.    Pulmonary/Chest: Effort normal and breath sounds normal.   Abdominal: Soft. Nontender  Skin: Skin is warm and dry.   Psychiatric: Mood, memory, affect and judgment normal.     Assessment & Plan     Diagnosis:  Indigestion  Constipation    Anticipated Surgical Procedure:  EGD  Colonoscopy    The risks, benefits, and alternatives of this procedure have been discussed with the patient or the responsible party- the patient understands and agrees to proceed.

## 2022-10-19 NOTE — ANESTHESIA PREPROCEDURE EVALUATION
Anesthesia Evaluation     Patient summary reviewed and Nursing notes reviewed                Airway   Mallampati: III  Possible difficult intubation  Dental - normal exam     Pulmonary - normal exam   (+) asthma,sleep apnea on CPAP,   (-) not a smoker  Cardiovascular - normal exam    ECG reviewed    (+) hypertension, hyperlipidemia,     ROS comment: Reviewed stress test and cath reports:    ? Findings consistent with a normal ECG stress test.  ? The patient was quite tachycardic at baseline, and reached 100% of his age predicted maximum heart rate.         Neuro/Psych- negative ROS  GI/Hepatic/Renal/Endo    (+) obesity,  GERD,  renal disease stones, diabetes mellitus type 2,     Musculoskeletal     Abdominal   (+) obese,    Substance History      OB/GYN          Other                      Anesthesia Plan    ASA 3     MAC       Anesthetic plan, risks, benefits, and alternatives have been provided, discussed and informed consent has been obtained with: patient.        CODE STATUS:

## 2022-10-19 NOTE — ANESTHESIA POSTPROCEDURE EVALUATION
"Patient: Jose Almazan    Procedure Summary     Date: 10/19/22 Room / Location: [facility] ENDOSCOPY 10 /  [facility]    Anesthesia Start: 1555 Anesthesia Stop: 1634    Procedures:       ESOPHAGOGASTRODUODENOSCOPY WITH BIOPSY (Esophagus)      COLONOSCOPY TO CECUM Diagnosis:       Precordial pain      Constipation, unspecified constipation type      (Precordial pain [R07.2])      (Constipation, unspecified constipation type [K59.00])    Surgeons: Gasper Xie MD Provider: Ivet To MD    Anesthesia Type: MAC ASA Status: 3          Anesthesia Type: MAC    Vitals  Vitals Value Taken Time   /99 10/19/22 1747   Temp 36.3 °C (97.3 °F) 10/19/22 1706   Pulse 93 10/19/22 1706   Resp 16 10/19/22 1706   SpO2 96 % 10/19/22 1747           Post Anesthesia Care and Evaluation    Patient location during evaluation: PHASE II  Patient participation: complete - patient participated  Level of consciousness: awake  Pain management: adequate    Airway patency: patent  Anesthetic complications: No anesthetic complications    Cardiovascular status: acceptable  Respiratory status: acceptable  Hydration status: acceptable    Comments: /99 (BP Location: Left arm, Patient Position: Lying)   Pulse 93   Temp 36.3 °C (97.3 °F)   Resp 16   Ht 175.3 cm (69\")   Wt 120 kg (265 lb 3.2 oz)   SpO2 96%   BMI 39.16 kg/m²       "

## 2022-10-19 NOTE — DISCHARGE INSTRUCTIONS
For the next 24 hours patient needs to be with a responsible adult.    For 24 hours DO NOT drive, operate machinery, appliances, drink alcohol, make important decisions or sign legal documents.    Start with a light or bland diet if you are feeling sick to your stomach otherwise advance to regular diet as tolerated.    Follow recommendations on procedure report if provided by your doctor.    Call Dr MUSE for problems [phone]    Problems may include but not limited to: large amounts of bleeding, trouble breathing, repeated vomiting, severe unrelieved pain, fever or chills.

## 2022-10-21 LAB
LAB AP CASE REPORT: NORMAL
PATH REPORT.FINAL DX SPEC: NORMAL
PATH REPORT.GROSS SPEC: NORMAL

## 2022-12-09 ENCOUNTER — TELEPHONE (OUTPATIENT)
Dept: GASTROENTEROLOGY | Facility: CLINIC | Age: 52
End: 2022-12-09

## 2022-12-09 NOTE — TELEPHONE ENCOUNTER
----- Message from Gasper Xie MD sent at 11/6/2022  5:42 PM EST -----  Benign  Office f/u 6-8 weeks

## 2022-12-15 ENCOUNTER — OFFICE VISIT (OUTPATIENT)
Dept: CARDIOLOGY | Facility: CLINIC | Age: 52
End: 2022-12-15

## 2022-12-15 VITALS
OXYGEN SATURATION: 97 % | SYSTOLIC BLOOD PRESSURE: 139 MMHG | WEIGHT: 270 LBS | HEIGHT: 69 IN | HEART RATE: 99 BPM | BODY MASS INDEX: 39.99 KG/M2 | DIASTOLIC BLOOD PRESSURE: 81 MMHG

## 2022-12-15 DIAGNOSIS — E11.69 DIABETES MELLITUS TYPE 2 IN OBESE: ICD-10-CM

## 2022-12-15 DIAGNOSIS — I10 PRIMARY HYPERTENSION: ICD-10-CM

## 2022-12-15 DIAGNOSIS — E66.9 DIABETES MELLITUS TYPE 2 IN OBESE: ICD-10-CM

## 2022-12-15 DIAGNOSIS — E78.00 HYPERCHOLESTEROLEMIA: Primary | ICD-10-CM

## 2022-12-15 DIAGNOSIS — G47.33 OSA (OBSTRUCTIVE SLEEP APNEA): ICD-10-CM

## 2022-12-15 DIAGNOSIS — E66.09 CLASS 2 OBESITY DUE TO EXCESS CALORIES WITHOUT SERIOUS COMORBIDITY WITH BODY MASS INDEX (BMI) OF 39.0 TO 39.9 IN ADULT: ICD-10-CM

## 2022-12-15 PROCEDURE — 99214 OFFICE O/P EST MOD 30 MIN: CPT | Performed by: NURSE PRACTITIONER

## 2022-12-15 PROCEDURE — 93000 ELECTROCARDIOGRAM COMPLETE: CPT | Performed by: NURSE PRACTITIONER

## 2022-12-15 RX ORDER — DAPAGLIFLOZIN 10 MG/1
1 TABLET, FILM COATED ORAL EVERY MORNING
COMMUNITY
Start: 2022-11-22

## 2022-12-15 RX ORDER — INSULIN GLARGINE 100 [IU]/ML
INJECTION, SOLUTION SUBCUTANEOUS
COMMUNITY
Start: 2022-11-22

## 2022-12-15 RX ORDER — OMEPRAZOLE 40 MG/1
1 CAPSULE, DELAYED RELEASE ORAL EVERY MORNING
COMMUNITY
Start: 2022-11-22

## 2022-12-15 NOTE — PROGRESS NOTES
Date of Office Visit: 12/15/2022  Encounter Provider: JOSE Carrasco  Place of Service: [facility] CARDIOLOGY  Patient Name: Jose Almazan  :1970    Chief Complaint   Patient presents with   • Chest Pain   • Hypertension   :     HPI: Jose Almazan is a 52 y.o. male who is a patient of  Dr. Dalton and is new to me today. He has a history of hyperlipidemia, GURDEEP obesity and type 2 diabetes mellitus. In  of this year she was evaluated for chest pain. There were negative cardiac enzymes and had a treadmill stress test that was negative and he was diagnosed with shingles. He was last in the office after that and saw Deborah and was doing well. He is here for 6 month follow up today.    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  He has not had any chest discomfort since we last saw him.  He feels well.  His blood sugars were recently higher and he is scheduled to go see an endocrinologist.  Previous testing and notes have been reviewed by me.   Past Medical History:   Diagnosis Date   • Asthma    • Diabetes mellitus (HCC)    • GERD (gastroesophageal reflux disease)    • Hyperlipidemia    • Hypertension    • Kidney stones    • Morbid obesity (HCC)    • GURDEEP on CPAP        Past Surgical History:   Procedure Laterality Date   • ABSCESS DRAINAGE N/A     rectal abscess   • CARDIAC CATHETERIZATION N/A 2019    Procedure: Left Heart Cath;  Surgeon: Nikolai Bolanos MD;  Location: [facility] CATH INVASIVE LOCATION;  Service: Cardiology   • CHOLECYSTECTOMY     • COLONOSCOPY     • COLONOSCOPY W/ POLYPECTOMY N/A 10/19/2022    Procedure: COLONOSCOPY TO CECUM;  Surgeon: Gasper Xie MD;  Location: [facility] ENDOSCOPY;  Service: Gastroenterology;  Laterality: N/A;  PREOP/ CONSTIPATION  POSTOP/ NORMAL   • ENDOSCOPY N/A 10/19/2022    Procedure: ESOPHAGOGASTRODUODENOSCOPY WITH BIOPSY;  Surgeon: Gasper Xie MD;  Location:   [facility] ENDOSCOPY;  Service: Gastroenterology;  Laterality: N/A;  PREOP/ HEARTBURN  POSTOP/ GASTRITIS   • HERNIA MESH REMOVAL  2018    removal of infected mesh & hernia repair   • INCISIONAL HERNIA REPAIR  2016    LAPAROSCOPIC REPAIR OF INCARCERATED INCISIONAL HERNIA CAUSING OBSTRUCTION WITH DUALMESH PLUS   • INCISIONAL HERNIA REPAIR N/A 2019   • KIDNEY STONE SURGERY     • LASIK     • TONSILLECTOMY     • UPPER GASTROINTESTINAL ENDOSCOPY         Social History     Socioeconomic History   • Marital status:    Tobacco Use   • Smoking status: Never   • Smokeless tobacco: Never   • Tobacco comments:     caffeine use- diet coke, tea   Substance and Sexual Activity   • Alcohol use: Yes     Comment: Socially.  3-4 beers per week on average.   • Drug use: No   • Sexual activity: Defer       Family History   Problem Relation Age of Onset   • Heart disease Mother         Mother  from MI and perforated coronary artery during cath   • Heart disease Father         Father with 4 vessel CABG in his late 40's.  Also with 2 MI's.   • Diabetes Father    • Down syndrome Sister    • Heart disease Sister    • Coronary artery disease Paternal Grandfather         Paternal GF with 3 MI's       Review of Systems   Constitutional: Negative for diaphoresis and malaise/fatigue.   Cardiovascular: Negative for chest pain, claudication, dyspnea on exertion, irregular heartbeat, leg swelling, near-syncope, orthopnea, palpitations, paroxysmal nocturnal dyspnea and syncope.   Respiratory: Negative for cough, shortness of breath and sleep disturbances due to breathing.    Musculoskeletal: Negative for falls.   Neurological: Negative for dizziness and weakness.   Psychiatric/Behavioral: Negative for altered mental status and substance abuse.       No Known Allergies      Current Outpatient Medications:   •  aspirin 325 MG tablet, Take 325 mg by mouth Daily., Disp: , Rfl:   •  celecoxib (CeleBREX) 200 MG capsule, Take 1  capsule by mouth Daily., Disp: , Rfl:   •  dapagliflozin Propanediol (Farxiga) 10 MG tablet, Take 1 tablet by mouth Every Morning., Disp: , Rfl:   •  glimepiride (AMARYL) 4 MG tablet, Take 1 tablet by mouth 2 (Two) Times a Day., Disp: , Rfl:   •  glucose blood test strip, 1 strip by Other route., Disp: , Rfl:   •  Insulin Glargine (BASAGLAR KWIKPEN) 100 UNIT/ML injection pen, INJECT 40 units into the skin daily, Disp: , Rfl:   •  Insulin Lispro (HUMALOG SC), Inject 16 Units under the skin into the appropriate area as directed 3 (Three) Times a Day Before Meals., Disp: , Rfl:   •  irbesartan (AVAPRO) 300 MG tablet, Take 300 mg by mouth Daily., Disp: , Rfl:   •  Liraglutide (VICTOZA) 18 MG/3ML solution pen-injector injection, Inject 1.8 mg under the skin., Disp: , Rfl:   •  metFORMIN ER (GLUCOPHAGE-XR) 500 MG 24 hr tablet, Take 2,000 mg by mouth., Disp: , Rfl:   •  omeprazole (priLOSEC) 40 MG capsule, Take 40 mg by mouth Daily., Disp: , Rfl:   •  omeprazole (priLOSEC) 40 MG capsule, Take 1 capsule by mouth Every Morning., Disp: , Rfl:   •  ROSUVASTATIN CALCIUM PO, Take 20 mg by mouth Daily., Disp: , Rfl:   •  sucralfate (Carafate) 1 GM/10ML suspension, Take 10 mL by mouth 4 (Four) Times a Day., Disp: 414 mL, Rfl: 1      Objective:     There were no vitals filed for this visit.  There is no height or weight on file to calculate BMI.    PHYSICAL EXAM:    Constitutional:       General: Not in acute distress.     Appearance: Normal appearance. Well-developed.   Eyes:      Pupils: Pupils are equal, round, and reactive to light.   HENT:      Head: Normocephalic.   Neck:      Vascular: No carotid bruit or JVD.   Pulmonary:      Effort: Pulmonary effort is normal. No tachypnea.      Breath sounds: Normal breath sounds. No wheezing. No rales.   Cardiovascular:      Normal rate. Regular rhythm.      No gallop.   Pulses:     Intact distal pulses.   Abdominal:      General: Bowel sounds are normal.      Palpations: Abdomen is  soft.      Tenderness: There is no abdominal tenderness.   Musculoskeletal: Normal range of motion.      Cervical back: Normal range of motion and neck supple. No edema. Skin:     General: Skin is warm and dry.   Neurological:      Mental Status: Alert and oriented to person, place, and time.           ECG 12 Lead    Date/Time: 12/15/2022 3:06 PM  Performed by: Janis Angelo APRN  Authorized by: Janis Angelo APRN   Comparison: compared with previous ECG from 6/3/2022  Similar to previous ECG  Rhythm: sinus rhythm  Ectopy: atrial premature contractions  Rate: normal  QRS axis: normal    Clinical impression: non-specific ECG              Assessment:       Diagnosis Plan   1. Hypercholesterolemia        2. Primary hypertension        3. Diabetes mellitus type 2 in obese (HCC)        4. GURDEEP (obstructive sleep apnea)          No orders of the defined types were placed in this encounter.         Plan:       We talked about some medications for diabetes that can help be cardiac protective.  He is got a dose good family history and increased risk factors.  I encouraged him to try to lose weight and get his blood sugar under better control.  We can see him back in 6 months he is going to see Dr. Barry         Your medication list          Accurate as of December 15, 2022  2:37 PM. If you have any questions, ask your nurse or doctor.            CONTINUE taking these medications      Instructions Last Dose Given Next Dose Due   aspirin 325 MG tablet      Take 325 mg by mouth Daily.       BASAGLAR KWIKPEN 100 UNIT/ML injection pen      INJECT 40 units into the skin daily       celecoxib 200 MG capsule  Commonly known as: CeleBREX      Take 1 capsule by mouth Daily.       Farxiga 10 MG tablet  Generic drug: dapagliflozin Propanediol      Take 1 tablet by mouth Every Morning.       glimepiride 4 MG tablet  Commonly known as: AMARYL      Take 1 tablet by mouth 2 (Two) Times a Day.       glucose blood test strip      1  strip by Other route.       HUMALOG SC      Inject 16 Units under the skin into the appropriate area as directed 3 (Three) Times a Day Before Meals.       irbesartan 300 MG tablet  Commonly known as: AVAPRO      Take 300 mg by mouth Daily.       Liraglutide 18 MG/3ML solution pen-injector injection  Commonly known as: VICTOZA      Inject 1.8 mg under the skin.       metFORMIN  MG 24 hr tablet  Commonly known as: GLUCOPHAGE-XR      Take 2,000 mg by mouth.       omeprazole 40 MG capsule  Commonly known as: priLOSEC      Take 1 capsule by mouth Every Morning.       omeprazole 40 MG capsule  Commonly known as: priLOSEC      Take 40 mg by mouth Daily.       ROSUVASTATIN CALCIUM PO      Take 20 mg by mouth Daily.       sucralfate 1 GM/10ML suspension  Commonly known as: Carafate      Take 10 mL by mouth 4 (Four) Times a Day.                As always, it has been a pleasure to participate in your patient's care.      Sincerely,     Janis GARCIA

## 2023-02-06 ENCOUNTER — OFFICE VISIT (OUTPATIENT)
Dept: GASTROENTEROLOGY | Facility: CLINIC | Age: 53
End: 2023-02-06
Payer: COMMERCIAL

## 2023-02-06 VITALS
SYSTOLIC BLOOD PRESSURE: 111 MMHG | BODY MASS INDEX: 39.07 KG/M2 | TEMPERATURE: 96.6 F | HEIGHT: 69 IN | OXYGEN SATURATION: 96 % | DIASTOLIC BLOOD PRESSURE: 74 MMHG | HEART RATE: 92 BPM | WEIGHT: 263.8 LBS

## 2023-02-06 DIAGNOSIS — K21.9 GASTROESOPHAGEAL REFLUX DISEASE, UNSPECIFIED WHETHER ESOPHAGITIS PRESENT: Primary | ICD-10-CM

## 2023-02-06 DIAGNOSIS — K43.0 RECURRENT INCISIONAL HERNIA WITH INCARCERATION: ICD-10-CM

## 2023-02-06 PROCEDURE — 99213 OFFICE O/P EST LOW 20 MIN: CPT | Performed by: INTERNAL MEDICINE

## 2023-02-06 RX ORDER — INSULIN DEGLUDEC INJECTION 100 U/ML
INJECTION, SOLUTION SUBCUTANEOUS
COMMUNITY
Start: 2023-01-30

## 2023-02-06 RX ORDER — SEMAGLUTIDE 1.34 MG/ML
INJECTION, SOLUTION SUBCUTANEOUS
COMMUNITY
Start: 2023-01-30

## 2023-02-06 RX ORDER — INSULIN ASPART 100 [IU]/ML
15-30 INJECTION, SOLUTION INTRAVENOUS; SUBCUTANEOUS 3 TIMES DAILY
COMMUNITY
Start: 2023-01-04

## 2023-02-06 RX ORDER — PROCHLORPERAZINE 25 MG/1
SUPPOSITORY RECTAL
COMMUNITY
Start: 2023-01-06

## 2023-02-06 RX ORDER — PROCHLORPERAZINE 25 MG/1
SUPPOSITORY RECTAL
COMMUNITY
Start: 2023-01-05

## 2023-02-06 NOTE — PROGRESS NOTES
Chief Complaint   Patient presents with   • Heartburn     Subjective     HPI  Jose Almazan is a 52 y.o. male who presents today for office follow up.  Presents today for follow-up from recent endoscopy.  He underwent EGD and colonoscopy done for some precordial chest pain and constipation.  Both his EGD and colonoscopy were relatively unremarkable.  Doing well since that time.  Remains on omeprazole for GERD.  He has been started on Ozempic his weight is down about 11 pounds and his diabetes is under excellent control.  He is hoping to continue to lose an additional 40 to 50 pounds to eventually be a candidate to repair his large abdominal wall hernia which is his only ongoing complaint.  He does follow with Dr. Perea for this.    Objective   Vitals:    02/06/23 1427   BP: 111/74   Pulse: 92   Temp: 96.6 °F (35.9 °C)   SpO2: 96%       Physical Exam  Vitals reviewed.   Constitutional:       Appearance: He is well-developed.   HENT:      Head: Normocephalic and atraumatic.   Neurological:      Mental Status: He is alert and oriented to person, place, and time.   Psychiatric:         Behavior: Behavior normal.         Thought Content: Thought content normal.         Judgment: Judgment normal.                Assessment & Plan   Assessment:     1. Gastroesophageal reflux disease, unspecified whether esophagitis present    2. Recurrent incisional hernia with incarceration      Plan:   Continue current PPI regimen  Recall colonoscopy 10 years    Office f/u PRN          Gasper Xie M.D.  [facility], [place], operated by [facility]  [address] [place] [address]  Office: [phone]

## 2023-05-04 NOTE — CASE MANAGEMENT/SOCIAL WORK
Discharge Planning Assessment  [facility]     Patient Name: Jose Almazan  MRN: [id number]  Today's Date: 6/2/2022    Admit Date: 6/2/2022     Discharge Needs Assessment     Row Name 06/02/22 1908       Living Environment    People in Home spouse    Name(s) of People in Home Merary Almazan (wife)    Current Living Arrangements home    Primary Care Provided by self    Provides Primary Care For no one    Family Caregiver if Needed spouse    Family Caregiver Names Merary Almazan (wife)    Quality of Family Relationships supportive    Able to Return to Prior Arrangements yes       Resource/Environmental Concerns    Resource/Environmental Concerns none    Transportation Concerns none       Transition Planning    Patient/Family Anticipates Transition to home with family    Patient/Family Anticipated Services at Transition none    Transportation Anticipated family or friend will provide       Discharge Needs Assessment    Readmission Within the Last 30 Days no previous admission in last 30 days    Equipment Currently Used at Home cpap;glucometer  Patient does have a glucometer but states that he does not use it.    Concerns to be Addressed no discharge needs identified;denies needs/concerns at this time    Anticipated Changes Related to Illness none    Equipment Needed After Discharge none    Patient's Choice of Community Agency(s) Denies any needs at discharge               Discharge Plan     Row Name 06/02/22 1911       Plan    Plan Discharge home with family    Patient/Family in Agreement with Plan yes    Plan Comments I entered the patients room in proper PPE, introduced myself and my role.  The patient lives with his wife Merary who was entering the room as I was leaving.  The patient states that he uses [facility] at [phone].  He denies ever using Home Health or Rehab.  The patient states that his wife will transport him home when he is discharged.  I did put in a consult for the Diabetic Educator, the  patient states that he uses insulin pens but doesn't use his glucometer.  The patient denies any needs at discharge at this time.  I did encourage him to request Case Management should his needs change.  He verbalized understanding and had no further questions/concerns at this time.              Continued Care and Services - Admitted Since 6/2/2022    Coordination has not been started for this encounter.          Demographic Summary    No documentation.                Functional Status    No documentation.                Psychosocial    No documentation.                Abuse/Neglect    No documentation.                Legal    No documentation.                Substance Abuse    No documentation.                Patient Forms    No documentation.                   Keyla Do RN  Discharge Planning Assessment  [facility]     Patient Name: Jose Almazan  MRN: [id number]  Today's Date: 6/2/2022    Admit Date: 6/2/2022     Discharge Needs Assessment     Row Name 06/02/22 1908       Living Environment    People in Home spouse    Name(s) of People in Home Merary Almazan (wife)    Current Living Arrangements home    Primary Care Provided by self    Provides Primary Care For no one    Family Caregiver if Needed spouse    Family Caregiver Names Merary Almazan (wife)    Quality of Family Relationships supportive    Able to Return to Prior Arrangements yes       Resource/Environmental Concerns    Resource/Environmental Concerns none    Transportation Concerns none       Transition Planning    Patient/Family Anticipates Transition to home with family    Patient/Family Anticipated Services at Transition none    Transportation Anticipated family or friend will provide       Discharge Needs Assessment    Readmission Within the Last 30 Days no previous admission in last 30 days    Equipment Currently Used at Home cpap;glucometer  Patient does have a glucometer but states that he does not use it.    Concerns to be Addressed no  discharge needs identified;denies needs/concerns at this time    Anticipated Changes Related to Illness none    Equipment Needed After Discharge none    Patient's Choice of Community Agency(s) Denies any needs at discharge               Discharge Plan     Row Name 06/02/22 1911       Plan    Plan Discharge home with family    Patient/Family in Agreement with Plan yes    Plan Comments I entered the patients room in proper PPE, introduced myself and my role.  The patient lives with his wife Merary who was entering the room as I was leaving.  The patient states that he uses [facility] at [phone].  He denies ever using Home Health or Rehab.  The patient states that his wife will transport him home when he is discharged.  I did put in a consult for the Diabetic Educator, the patient states that he uses insulin pens but doesn't use his glucometer.  The patient denies any needs at discharge at this time.  I did encourage him to request Case Management should his needs change.  He verbalized understanding and had no further questions/concerns at this time.              Continued Care and Services - Admitted Since 6/2/2022    Coordination has not been started for this encounter.          Demographic Summary    No documentation.                Functional Status    No documentation.                Psychosocial    No documentation.                Abuse/Neglect    No documentation.                Legal    No documentation.                Substance Abuse    No documentation.                Patient Forms    No documentation.                   Keyla Do RN     Detail Level: Zone Reassured the patient that this was unlikely to have been caused by metformin. \\nDiscussed possible causes--wet work, nail dehydration, vitamin deficiency.  Her CBC is normal making low iron less likely.\\nEncouraged patient to continue Biotin for the next month 6 months. Will consider nail strengthener/thickener if there is no improvement. Keep skin clean (soap and water) and dry (use hair dryer on cold after shower).

## 2023-07-24 ENCOUNTER — OFFICE VISIT (OUTPATIENT)
Dept: SURGERY | Facility: CLINIC | Age: 53
End: 2023-07-24
Payer: COMMERCIAL

## 2023-07-24 VITALS
WEIGHT: 226 LBS | SYSTOLIC BLOOD PRESSURE: 102 MMHG | HEIGHT: 69 IN | DIASTOLIC BLOOD PRESSURE: 80 MMHG | BODY MASS INDEX: 33.47 KG/M2

## 2023-07-24 DIAGNOSIS — K43.2 INCISIONAL HERNIA, WITHOUT OBSTRUCTION OR GANGRENE: Primary | ICD-10-CM

## 2023-07-24 PROCEDURE — 99203 OFFICE O/P NEW LOW 30 MIN: CPT | Performed by: SURGERY

## 2023-07-26 NOTE — PROGRESS NOTES
ASSESSMENT/PLAN:    53-year-old gentleman with large incisional hernia but fairly small fascial defect in the supraumbilical region.  This is a recurrence from 3 laparoscopic/robotic efforts at repair done at [facility].  He would like to proceed with repair sometime this fall but would like to try to lose more weight before then which would certainly be beneficial.  He understands the nature of the procedure (open recurrent incisional hernia repair with mesh) with risks including but not limited to bleeding, infection, use of mesh, and recurrence.  He understands that he is at increased risk for surgical and anesthetic complications secondary to multiple medical comorbidities associated with obesity.    CC:     Incisional hernia    HPI:    53-year-old gentleman with recurrent incisional hernia.  I saw him for this back in 2020.  He has lost approximately 40 pounds since I last saw him.  Relevant review of systems negative other than presenting complaints.    ENDOSCOPY:   EGD and colonoscopy 10/19/2022     SOCIAL HISTORY:   Denies tobacco use  Occasional alcohol use    FAMILY HISTORY:    Colorectal cancer: Negative    PREVIOUS ABDOMINAL SURGERY    Laparoscopic umbilical hernia repair 2016  Laparoscopic/robotic incisional hernia repair with removal of what was said to be infected mesh 2018  Laparoscopic robotic incisional hernia repair 2019    PAST MEDICAL HISTORY:    Obesity  Diabetes mellitus  Gastroesophageal reflux disease  Hypertension  Hyperlipidemia  Sleep apnea    MEDICATIONS:   Aspirin 325 mg  Celebrex  Farxiga  Insulin  Avapro  Glucophage  Prilosec  Ozempic  Rosuvastatin    ALLERGIES:   None    PHYSICAL EXAM:   Constitutional: Well-developed well-nourished, no acute distress  Vital signs:   Weight: 226 pounds  Height: 69 inches  BMI: 33.4  Neck: Supple, no palpable mass, trachea midline  Respiratory: Normal nonlabored inspiratory effort  Cardiovascular: Regular rate, no jugular venous  distention  Gastrointestinal: Soft, nontender, large palpable incisional hernia in the supraumbilical region that in supine position I was able to reduce.  The fascial defect relative to the size of the hernia was fairly small.  Psychiatric: Alert and oriented ×3, normal affect     SRUTHI MENDOZA M.D.

## 2023-10-09 ENCOUNTER — PRE-ADMISSION TESTING (OUTPATIENT)
Dept: PREADMISSION TESTING | Facility: HOSPITAL | Age: 53
End: 2023-10-09
Payer: COMMERCIAL

## 2023-10-09 VITALS
BODY MASS INDEX: 32.29 KG/M2 | HEART RATE: 84 BPM | RESPIRATION RATE: 20 BRPM | HEIGHT: 69 IN | SYSTOLIC BLOOD PRESSURE: 118 MMHG | DIASTOLIC BLOOD PRESSURE: 79 MMHG | TEMPERATURE: 97.3 F | OXYGEN SATURATION: 98 % | WEIGHT: 218 LBS

## 2023-10-09 LAB
ANION GAP SERPL CALCULATED.3IONS-SCNC: 15.3 MMOL/L (ref 5–15)
BUN SERPL-MCNC: 16 MG/DL (ref 6–20)
BUN/CREAT SERPL: 13.7 (ref 7–25)
CALCIUM SPEC-SCNC: 9.7 MG/DL (ref 8.6–10.5)
CHLORIDE SERPL-SCNC: 100 MMOL/L (ref 98–107)
CO2 SERPL-SCNC: 22.7 MMOL/L (ref 22–29)
CREAT SERPL-MCNC: 1.17 MG/DL (ref 0.76–1.27)
DEPRECATED RDW RBC AUTO: 42 FL (ref 37–54)
EGFRCR SERPLBLD CKD-EPI 2021: 74.1 ML/MIN/1.73
ERYTHROCYTE [DISTWIDTH] IN BLOOD BY AUTOMATED COUNT: 12.5 % (ref 12.3–15.4)
GLUCOSE SERPL-MCNC: 82 MG/DL (ref 65–99)
HCT VFR BLD AUTO: 36.5 % (ref 37.5–51)
HGB BLD-MCNC: 12.4 G/DL (ref 13–17.7)
MCH RBC QN AUTO: 31.4 PG (ref 26.6–33)
MCHC RBC AUTO-ENTMCNC: 34 G/DL (ref 31.5–35.7)
MCV RBC AUTO: 92.4 FL (ref 79–97)
PLATELET # BLD AUTO: 190 10*3/MM3 (ref 140–450)
PMV BLD AUTO: 9.6 FL (ref 6–12)
POTASSIUM SERPL-SCNC: 4 MMOL/L (ref 3.5–5.2)
QT INTERVAL: 347 MS
QTC INTERVAL: 413 MS
RBC # BLD AUTO: 3.95 10*6/MM3 (ref 4.14–5.8)
SODIUM SERPL-SCNC: 138 MMOL/L (ref 136–145)
WBC NRBC COR # BLD: 4.13 10*3/MM3 (ref 3.4–10.8)

## 2023-10-09 PROCEDURE — 85027 COMPLETE CBC AUTOMATED: CPT

## 2023-10-09 PROCEDURE — 93005 ELECTROCARDIOGRAM TRACING: CPT

## 2023-10-09 PROCEDURE — 80048 BASIC METABOLIC PNL TOTAL CA: CPT

## 2023-10-09 PROCEDURE — 36415 COLL VENOUS BLD VENIPUNCTURE: CPT

## 2023-10-09 NOTE — DISCHARGE INSTRUCTIONS
Take the following medications the morning of surgery:    omeprazole    If you are on prescription narcotic pain medication to control your pain you may also take that medication the morning of surgery.    General Instructions:  Do not eat solid food after midnight the night before surgery.  You may drink clear liquids day of surgery but must stop at least one hour before your hospital arrival time.  It is beneficial for you to have a clear drink that contains carbohydrates the day of surgery.  We suggest a 12 to 20 ounce bottle of Gatorade or Powerade for non-diabetic patients or a 12 to 20 ounce bottle of G2 or Powerade Zero for diabetic patients. (Pediatric patients, are not advised to drink a 12 to 20 ounce carbohydrate drink)    Clear liquids are liquids you can see through.  Nothing red in color.     Plain water                               Sports drinks  Sodas                                   Gelatin (Jell-O)  Fruit juices without pulp such as white grape juice and apple juice  Popsicles that contain no fruit or yogurt  Tea or coffee (no cream or milk added)  Gatorade / Powerade  G2 / Powerade Zero    Infants may have breast milk up to four hours before surgery.  Infants drinking formula may drink formula up to six hours before surgery.   Patients who avoid smoking, chewing tobacco and alcohol for 4 weeks prior to surgery have a reduced risk of post-operative complications.  Quit smoking as many days before surgery as you can.  Do not smoke, use chewing tobacco or drink alcohol the day of surgery.   If applicable bring your C-PAP/ BI-PAP machine in with you to preop day of surgery.  Bring any papers given to you in the doctor's office.  Wear clean comfortable clothes.  Do not wear contact lenses, false eyelashes or make-up.  Bring a case for your glasses.   Bring crutches or walker if applicable.  Remove all piercings.  Leave jewelry and any other valuables at home.  Hair extensions with metal clips must be  removed prior to surgery.  The Pre-Admission Testing nurse will instruct you to bring medications if unable to obtain an accurate list in Pre-Admission Testing.        If you were given a blood bank ID arm band remember to bring it with you the day of surgery.    Preventing a Surgical Site Infection:  For 2 to 3 days before surgery, avoid shaving with a razor because the razor can irritate skin and make it easier to develop an infection.    Any areas of open skin can increase the risk of a post-operative wound infection by allowing bacteria to enter and travel throughout the body.  Notify your surgeon if you have any skin wounds / rashes even if it is not near the expected surgical site.  The area will need assessed to determine if surgery should be delayed until it is healed.  The night prior to surgery shower using a fresh bar of anti-bacterial soap (such as Dial) and clean washcloth.  Sleep in a clean bed with clean clothing.  Do not allow pets to sleep with you.  Shower on the morning of surgery using a fresh bar of anti-bacterial soap (such as Dial) and clean washcloth.  Dry with a clean towel and dress in clean clothing.  Ask your surgeon if you will be receiving antibiotics prior to surgery.  Make sure you, your family, and all healthcare providers clean their hands with soap and water or an alcohol based hand  before caring for you or your wound.    Day of surgery:10/17/2023   0530  Your arrival time is approximately two hours before your scheduled surgery time.  Upon arrival, a Pre-op nurse and Anesthesiologist will review your health history, obtain vital signs, and answer questions you may have.  The only belongings needed at this time will be a list of your home medications and if applicable your C-PAP/BI-PAP machine.  A Pre-op nurse will start an IV and you may receive medication in preparation for surgery, including something to help you relax.     Please be aware that surgery does come with  discomfort.  We want to make every effort to control your discomfort so please discuss any uncontrolled symptoms with your nurse.   Your doctor will most likely have prescribed pain medications.      If you are going home after surgery you will receive individualized written care instructions before being discharged.  A responsible adult must drive you to and from the hospital on the day of your surgery and stay with you for 24 hours.  Discharge prescriptions can be filled by the hospital pharmacy during regular pharmacy hours.  If you are having surgery late in the day/evening your prescription may be e-prescribed to your pharmacy.  Please verify your pharmacy hours or chose a 24 hour pharmacy to avoid not having access to your prescription because your pharmacy has closed for the day.    If you are staying overnight following surgery, you will be transported to your hospital room following the recovery period.  [facility] has all private rooms.    If you have any questions please call Pre-Admission Testing at [phone].  Deductibles and co-payments are collected on the day of service. Please be prepared to pay the required co-pay, deductible or deposit on the day of service as defined by your plan.    Call your surgeon immediately if you experience any of the following symptoms:  Sore Throat  Shortness of Breath or difficulty breathing  Cough  Chills  Body soreness or muscle pain  Headache  Fever  New loss of taste or smell  Do not arrive for your surgery ill.  Your procedure will need to be rescheduled to another time.  You will need to call your physician before the day of surgery to avoid any unnecessary exposure to hospital staff as well as other patients.  CHLORHEXIDINE CLOTH INSTRUCTIONS  The morning of surgery follow these instructions using the Chlorhexidine cloths you've been given.  These steps reduce bacteria on the body.  Do not use the cloths near your eyes, ears mouth, genitalia or  on open wounds.  Throw the cloths away after use but do not try to flush them down a toilet.      Open and remove one cloth at a time from the package.    Leave the cloth unfolded and begin the bathing.  Massage the skin with the cloths using gentle pressure to remove bacteria.  Do not scrub harshly.   Follow the steps below with one 2% CHG cloth per area (6 total cloths).  One cloth for neck, shoulders and chest.  One cloth for both arms, hands, fingers and underarms (do underarms last).  One cloth for the abdomen followed by groin.  One cloth for right leg and foot including between the toes.  One cloth for left leg and foot including between the toes.  The last cloth is to be used for the back of the neck, back and buttocks.    Allow the CHG to air dry 3 minutes on the skin which will give it time to work and decrease the chance of irritation.  The skin may feel sticky until it is dry.  Do not rinse with water or any other liquid or you will lose the beneficial effects of the CHG.  If mild skin irritation occurs, do rinse the skin to remove the CHG.  Report this to the nurse at time of admission.  Do not apply lotions, creams, ointments, deodorants or perfumes after using the clothes. Dress in clean clothes before coming to the hospital.

## 2023-10-11 ENCOUNTER — TELEPHONE (OUTPATIENT)
Dept: SURGERY | Facility: CLINIC | Age: 53
End: 2023-10-11
Payer: COMMERCIAL

## 2023-10-11 NOTE — TELEPHONE ENCOUNTER
PAT asked patient to call our office to see if you wanted patient to stop his Celebrex prior to surgery. Surgery is scheduled for 10/17/23

## 2023-10-16 ENCOUNTER — TELEPHONE (OUTPATIENT)
Dept: SURGERY | Facility: CLINIC | Age: 53
End: 2023-10-16
Payer: COMMERCIAL

## 2023-10-16 RX ORDER — SODIUM, POTASSIUM,MAG SULFATES 17.5-3.13G
2 SOLUTION, RECONSTITUTED, ORAL ORAL EVERY 12 HOURS
Qty: 708 ML | Refills: 0 | Status: SHIPPED | OUTPATIENT
Start: 2023-10-16 | End: 2023-10-20 | Stop reason: HOSPADM

## 2023-10-16 NOTE — TELEPHONE ENCOUNTER
Pt to have ventral hernia rpr tomorrow with you.  He is asking if you would want him to do a bowel prep today?  He feels he has not had good BM's and afraid his abd may be full when you operate.    Miralax does not work for him, however, he will try SuPrep if needed.  Do you recommend any prep?

## 2023-10-17 ENCOUNTER — ANESTHESIA (OUTPATIENT)
Dept: PERIOP | Facility: HOSPITAL | Age: 53
End: 2023-10-17
Payer: COMMERCIAL

## 2023-10-17 ENCOUNTER — ANESTHESIA EVENT (OUTPATIENT)
Dept: PERIOP | Facility: HOSPITAL | Age: 53
End: 2023-10-17
Payer: COMMERCIAL

## 2023-10-17 ENCOUNTER — HOSPITAL ENCOUNTER (OUTPATIENT)
Facility: HOSPITAL | Age: 53
Setting detail: OBSERVATION
LOS: 1 days | Discharge: HOME | End: 2023-10-20
Attending: SURGERY | Admitting: SURGERY
Payer: COMMERCIAL

## 2023-10-17 DIAGNOSIS — K43.2 INCISIONAL HERNIA, WITHOUT OBSTRUCTION OR GANGRENE: ICD-10-CM

## 2023-10-17 PROBLEM — K43.9 VENTRAL HERNIA: Status: ACTIVE | Noted: 2023-10-17

## 2023-10-17 PROBLEM — Z98.890 HISTORY OF INCISIONAL HERNIA REPAIR: Status: ACTIVE | Noted: 2023-10-17

## 2023-10-17 PROBLEM — Z87.19 HISTORY OF INCISIONAL HERNIA REPAIR: Status: ACTIVE | Noted: 2023-10-17

## 2023-10-17 LAB
GLUCOSE BLDC GLUCOMTR-MCNC: 115 MG/DL (ref 70–130)
GLUCOSE BLDC GLUCOMTR-MCNC: 122 MG/DL (ref 70–130)
GLUCOSE BLDC GLUCOMTR-MCNC: 125 MG/DL (ref 70–130)
GLUCOSE BLDC GLUCOMTR-MCNC: 154 MG/DL (ref 70–130)
GLUCOSE BLDC GLUCOMTR-MCNC: 50 MG/DL (ref 70–130)
GLUCOSE BLDC GLUCOMTR-MCNC: 60 MG/DL (ref 70–130)
GLUCOSE BLDC GLUCOMTR-MCNC: 74 MG/DL (ref 70–130)

## 2023-10-17 PROCEDURE — 25010000002 KETOROLAC TROMETHAMINE PER 15 MG: Performed by: SURGERY

## 2023-10-17 PROCEDURE — 25010000002 HYDROMORPHONE PER 4 MG: Performed by: NURSE ANESTHETIST, CERTIFIED REGISTERED

## 2023-10-17 PROCEDURE — 25010000002 HYDROMORPHONE 1 MG/ML SOLUTION: Performed by: NURSE ANESTHETIST, CERTIFIED REGISTERED

## 2023-10-17 PROCEDURE — 25010000002 FENTANYL CITRATE (PF) 50 MCG/ML SOLUTION: Performed by: ANESTHESIOLOGY

## 2023-10-17 PROCEDURE — 25010000002 PROPOFOL 200 MG/20ML EMULSION: Performed by: NURSE ANESTHETIST, CERTIFIED REGISTERED

## 2023-10-17 PROCEDURE — 88302 TISSUE EXAM BY PATHOLOGIST: CPT | Performed by: SURGERY

## 2023-10-17 PROCEDURE — 25010000002 CEFAZOLIN IN DEXTROSE 2-4 GM/100ML-% SOLUTION: Performed by: SURGERY

## 2023-10-17 PROCEDURE — 25810000003 LACTATED RINGERS PER 1000 ML: Performed by: ANESTHESIOLOGY

## 2023-10-17 PROCEDURE — 82948 REAGENT STRIP/BLOOD GLUCOSE: CPT

## 2023-10-17 PROCEDURE — 25010000002 SUGAMMADEX 200 MG/2ML SOLUTION: Performed by: NURSE ANESTHETIST, CERTIFIED REGISTERED

## 2023-10-17 PROCEDURE — 15734 MUSCLE-SKIN GRAFT TRUNK: CPT | Performed by: SURGERY

## 2023-10-17 PROCEDURE — 25010000002 ONDANSETRON PER 1 MG: Performed by: NURSE ANESTHETIST, CERTIFIED REGISTERED

## 2023-10-17 PROCEDURE — C1781 MESH (IMPLANTABLE): HCPCS | Performed by: SURGERY

## 2023-10-17 PROCEDURE — G0378 HOSPITAL OBSERVATION PER HR: HCPCS

## 2023-10-17 PROCEDURE — 25010000002 HYDROMORPHONE PER 4 MG: Performed by: SURGERY

## 2023-10-17 PROCEDURE — 25010000002 DEXAMETHASONE SODIUM PHOSPHATE 20 MG/5ML SOLUTION: Performed by: NURSE ANESTHETIST, CERTIFIED REGISTERED

## 2023-10-17 PROCEDURE — 49617 RPR AA HRN RCR > 10 RDC: CPT | Performed by: SURGERY

## 2023-10-17 PROCEDURE — 25010000002 ONDANSETRON PER 1 MG: Performed by: SURGERY

## 2023-10-17 PROCEDURE — 25810000003 SODIUM CHLORIDE 0.9 % SOLUTION: Performed by: SURGERY

## 2023-10-17 PROCEDURE — 63710000001 INSULIN LISPRO (HUMAN) PER 5 UNITS: Performed by: SURGERY

## 2023-10-17 DEVICE — BARD MESH
Type: IMPLANTABLE DEVICE | Site: ABDOMEN | Status: FUNCTIONAL
Brand: BARD MESH

## 2023-10-17 DEVICE — ARISTA AH ABSORBABLE HEMOSTATIC PARTICLES
Type: IMPLANTABLE DEVICE | Site: ABDOMEN | Status: FUNCTIONAL
Brand: ARISTA™ AH

## 2023-10-17 DEVICE — HORIZON TI LG 6 CLIPS/CART
Type: IMPLANTABLE DEVICE | Site: ABDOMEN | Status: FUNCTIONAL
Brand: WECK

## 2023-10-17 RX ORDER — CEFAZOLIN SODIUM 2 G/100ML
2000 INJECTION, SOLUTION INTRAVENOUS ONCE
Status: COMPLETED | OUTPATIENT
Start: 2023-10-17 | End: 2023-10-17

## 2023-10-17 RX ORDER — LOSARTAN POTASSIUM 100 MG/1
100 TABLET ORAL
Status: DISCONTINUED | OUTPATIENT
Start: 2023-10-17 | End: 2023-10-20 | Stop reason: HOSPADM

## 2023-10-17 RX ORDER — PROMETHAZINE HYDROCHLORIDE 25 MG/1
25 SUPPOSITORY RECTAL ONCE AS NEEDED
Status: DISCONTINUED | OUTPATIENT
Start: 2023-10-17 | End: 2023-10-17 | Stop reason: HOSPADM

## 2023-10-17 RX ORDER — FENTANYL CITRATE 50 UG/ML
50 INJECTION, SOLUTION INTRAMUSCULAR; INTRAVENOUS ONCE AS NEEDED
Status: COMPLETED | OUTPATIENT
Start: 2023-10-17 | End: 2023-10-17

## 2023-10-17 RX ORDER — BUPIVACAINE HYDROCHLORIDE AND EPINEPHRINE 5; 5 MG/ML; UG/ML
INJECTION, SOLUTION EPIDURAL; INTRACAUDAL; PERINEURAL AS NEEDED
Status: DISCONTINUED | OUTPATIENT
Start: 2023-10-17 | End: 2023-10-17 | Stop reason: HOSPADM

## 2023-10-17 RX ORDER — DIPHENHYDRAMINE HYDROCHLORIDE 50 MG/ML
12.5 INJECTION INTRAMUSCULAR; INTRAVENOUS
Status: DISCONTINUED | OUTPATIENT
Start: 2023-10-17 | End: 2023-10-17 | Stop reason: HOSPADM

## 2023-10-17 RX ORDER — LIDOCAINE HYDROCHLORIDE 10 MG/ML
0.5 INJECTION, SOLUTION INFILTRATION; PERINEURAL ONCE AS NEEDED
Status: DISCONTINUED | OUTPATIENT
Start: 2023-10-17 | End: 2023-10-17 | Stop reason: HOSPADM

## 2023-10-17 RX ORDER — EPHEDRINE SULFATE 50 MG/ML
5 INJECTION, SOLUTION INTRAVENOUS ONCE AS NEEDED
Status: DISCONTINUED | OUTPATIENT
Start: 2023-10-17 | End: 2023-10-17 | Stop reason: HOSPADM

## 2023-10-17 RX ORDER — HYDROMORPHONE HYDROCHLORIDE 1 MG/ML
0.5 INJECTION, SOLUTION INTRAMUSCULAR; INTRAVENOUS; SUBCUTANEOUS
Status: DISCONTINUED | OUTPATIENT
Start: 2023-10-17 | End: 2023-10-20 | Stop reason: HOSPADM

## 2023-10-17 RX ORDER — FAMOTIDINE 10 MG/ML
20 INJECTION, SOLUTION INTRAVENOUS ONCE
Status: COMPLETED | OUTPATIENT
Start: 2023-10-17 | End: 2023-10-17

## 2023-10-17 RX ORDER — DROPERIDOL 2.5 MG/ML
0.62 INJECTION, SOLUTION INTRAMUSCULAR; INTRAVENOUS
Status: DISCONTINUED | OUTPATIENT
Start: 2023-10-17 | End: 2023-10-17 | Stop reason: HOSPADM

## 2023-10-17 RX ORDER — OXYCODONE AND ACETAMINOPHEN 7.5; 325 MG/1; MG/1
1 TABLET ORAL EVERY 4 HOURS PRN
Status: DISCONTINUED | OUTPATIENT
Start: 2023-10-17 | End: 2023-10-17 | Stop reason: HOSPADM

## 2023-10-17 RX ORDER — LIDOCAINE HYDROCHLORIDE 20 MG/ML
INJECTION, SOLUTION INFILTRATION; PERINEURAL AS NEEDED
Status: DISCONTINUED | OUTPATIENT
Start: 2023-10-17 | End: 2023-10-17 | Stop reason: SURG

## 2023-10-17 RX ORDER — MIDAZOLAM HYDROCHLORIDE 1 MG/ML
1 INJECTION INTRAMUSCULAR; INTRAVENOUS
Status: DISCONTINUED | OUTPATIENT
Start: 2023-10-17 | End: 2023-10-17 | Stop reason: HOSPADM

## 2023-10-17 RX ORDER — OXYCODONE HYDROCHLORIDE AND ACETAMINOPHEN 5; 325 MG/1; MG/1
1 TABLET ORAL EVERY 4 HOURS PRN
Status: DISCONTINUED | OUTPATIENT
Start: 2023-10-17 | End: 2023-10-20 | Stop reason: HOSPADM

## 2023-10-17 RX ORDER — INSULIN LISPRO 100 [IU]/ML
3-14 INJECTION, SOLUTION INTRAVENOUS; SUBCUTANEOUS
Status: DISCONTINUED | OUTPATIENT
Start: 2023-10-17 | End: 2023-10-20 | Stop reason: HOSPADM

## 2023-10-17 RX ORDER — DEXTROSE MONOHYDRATE 25 G/50ML
25 INJECTION, SOLUTION INTRAVENOUS
Status: DISCONTINUED | OUTPATIENT
Start: 2023-10-17 | End: 2023-10-20 | Stop reason: HOSPADM

## 2023-10-17 RX ORDER — IBUPROFEN 600 MG/1
1 TABLET ORAL
Status: DISCONTINUED | OUTPATIENT
Start: 2023-10-17 | End: 2023-10-20 | Stop reason: HOSPADM

## 2023-10-17 RX ORDER — NALOXONE HCL 0.4 MG/ML
0.1 VIAL (ML) INJECTION
Status: DISCONTINUED | OUTPATIENT
Start: 2023-10-17 | End: 2023-10-20 | Stop reason: HOSPADM

## 2023-10-17 RX ORDER — ROCURONIUM BROMIDE 10 MG/ML
INJECTION, SOLUTION INTRAVENOUS AS NEEDED
Status: DISCONTINUED | OUTPATIENT
Start: 2023-10-17 | End: 2023-10-17 | Stop reason: SURG

## 2023-10-17 RX ORDER — OXYCODONE HYDROCHLORIDE AND ACETAMINOPHEN 5; 325 MG/1; MG/1
2 TABLET ORAL EVERY 4 HOURS PRN
Status: DISCONTINUED | OUTPATIENT
Start: 2023-10-17 | End: 2023-10-20 | Stop reason: HOSPADM

## 2023-10-17 RX ORDER — SODIUM CHLORIDE 9 MG/ML
100 INJECTION, SOLUTION INTRAVENOUS CONTINUOUS
Status: ACTIVE | OUTPATIENT
Start: 2023-10-17 | End: 2023-10-17

## 2023-10-17 RX ORDER — NICOTINE POLACRILEX 4 MG
15 LOZENGE BUCCAL
Status: DISCONTINUED | OUTPATIENT
Start: 2023-10-17 | End: 2023-10-20 | Stop reason: HOSPADM

## 2023-10-17 RX ORDER — LABETALOL HYDROCHLORIDE 5 MG/ML
5 INJECTION, SOLUTION INTRAVENOUS
Status: DISCONTINUED | OUTPATIENT
Start: 2023-10-17 | End: 2023-10-17 | Stop reason: HOSPADM

## 2023-10-17 RX ORDER — HYDRALAZINE HYDROCHLORIDE 20 MG/ML
5 INJECTION INTRAMUSCULAR; INTRAVENOUS
Status: DISCONTINUED | OUTPATIENT
Start: 2023-10-17 | End: 2023-10-17 | Stop reason: HOSPADM

## 2023-10-17 RX ORDER — NALOXONE HCL 0.4 MG/ML
0.2 VIAL (ML) INJECTION AS NEEDED
Status: DISCONTINUED | OUTPATIENT
Start: 2023-10-17 | End: 2023-10-17 | Stop reason: HOSPADM

## 2023-10-17 RX ORDER — FENTANYL CITRATE 50 UG/ML
50 INJECTION, SOLUTION INTRAMUSCULAR; INTRAVENOUS
Status: DISCONTINUED | OUTPATIENT
Start: 2023-10-17 | End: 2023-10-17 | Stop reason: HOSPADM

## 2023-10-17 RX ORDER — PROPOFOL 10 MG/ML
INJECTION, EMULSION INTRAVENOUS AS NEEDED
Status: DISCONTINUED | OUTPATIENT
Start: 2023-10-17 | End: 2023-10-17 | Stop reason: SURG

## 2023-10-17 RX ORDER — KETOROLAC TROMETHAMINE 30 MG/ML
15 INJECTION, SOLUTION INTRAMUSCULAR; INTRAVENOUS EVERY 6 HOURS
Status: DISCONTINUED | OUTPATIENT
Start: 2023-10-17 | End: 2023-10-18

## 2023-10-17 RX ORDER — ONDANSETRON 2 MG/ML
4 INJECTION INTRAMUSCULAR; INTRAVENOUS EVERY 6 HOURS PRN
Status: DISCONTINUED | OUTPATIENT
Start: 2023-10-17 | End: 2023-10-20 | Stop reason: HOSPADM

## 2023-10-17 RX ORDER — HYDROCODONE BITARTRATE AND ACETAMINOPHEN 5; 325 MG/1; MG/1
1 TABLET ORAL ONCE AS NEEDED
Status: DISCONTINUED | OUTPATIENT
Start: 2023-10-17 | End: 2023-10-17 | Stop reason: HOSPADM

## 2023-10-17 RX ORDER — IPRATROPIUM BROMIDE AND ALBUTEROL SULFATE 2.5; .5 MG/3ML; MG/3ML
3 SOLUTION RESPIRATORY (INHALATION) ONCE AS NEEDED
Status: DISCONTINUED | OUTPATIENT
Start: 2023-10-17 | End: 2023-10-17 | Stop reason: HOSPADM

## 2023-10-17 RX ORDER — SODIUM CHLORIDE, SODIUM LACTATE, POTASSIUM CHLORIDE, CALCIUM CHLORIDE 600; 310; 30; 20 MG/100ML; MG/100ML; MG/100ML; MG/100ML
9 INJECTION, SOLUTION INTRAVENOUS CONTINUOUS
Status: DISCONTINUED | OUTPATIENT
Start: 2023-10-17 | End: 2023-10-17

## 2023-10-17 RX ORDER — ONDANSETRON 2 MG/ML
4 INJECTION INTRAMUSCULAR; INTRAVENOUS ONCE AS NEEDED
Status: DISCONTINUED | OUTPATIENT
Start: 2023-10-17 | End: 2023-10-17 | Stop reason: HOSPADM

## 2023-10-17 RX ORDER — CEFAZOLIN SODIUM 2 G/100ML
2000 INJECTION, SOLUTION INTRAVENOUS EVERY 8 HOURS
Qty: 200 ML | Refills: 0 | Status: COMPLETED | OUTPATIENT
Start: 2023-10-17 | End: 2023-10-18

## 2023-10-17 RX ORDER — PROMETHAZINE HYDROCHLORIDE 25 MG/1
25 TABLET ORAL ONCE AS NEEDED
Status: DISCONTINUED | OUTPATIENT
Start: 2023-10-17 | End: 2023-10-17 | Stop reason: HOSPADM

## 2023-10-17 RX ORDER — PANTOPRAZOLE SODIUM 40 MG/1
40 TABLET, DELAYED RELEASE ORAL
Status: DISCONTINUED | OUTPATIENT
Start: 2023-10-17 | End: 2023-10-20 | Stop reason: HOSPADM

## 2023-10-17 RX ORDER — FLUMAZENIL 0.1 MG/ML
0.2 INJECTION INTRAVENOUS AS NEEDED
Status: DISCONTINUED | OUTPATIENT
Start: 2023-10-17 | End: 2023-10-17 | Stop reason: HOSPADM

## 2023-10-17 RX ORDER — SODIUM CHLORIDE 0.9 % (FLUSH) 0.9 %
3-10 SYRINGE (ML) INJECTION AS NEEDED
Status: DISCONTINUED | OUTPATIENT
Start: 2023-10-17 | End: 2023-10-17 | Stop reason: HOSPADM

## 2023-10-17 RX ORDER — ONDANSETRON 2 MG/ML
INJECTION INTRAMUSCULAR; INTRAVENOUS AS NEEDED
Status: DISCONTINUED | OUTPATIENT
Start: 2023-10-17 | End: 2023-10-17 | Stop reason: SURG

## 2023-10-17 RX ORDER — METFORMIN HYDROCHLORIDE 500 MG/1
2000 TABLET, EXTENDED RELEASE ORAL
Status: DISCONTINUED | OUTPATIENT
Start: 2023-10-17 | End: 2023-10-20 | Stop reason: HOSPADM

## 2023-10-17 RX ORDER — DEXTROSE MONOHYDRATE 25 G/50ML
INJECTION, SOLUTION INTRAVENOUS AS NEEDED
Status: DISCONTINUED | OUTPATIENT
Start: 2023-10-17 | End: 2023-10-17 | Stop reason: SURG

## 2023-10-17 RX ORDER — HYDROMORPHONE HYDROCHLORIDE 1 MG/ML
0.5 INJECTION, SOLUTION INTRAMUSCULAR; INTRAVENOUS; SUBCUTANEOUS
Status: DISCONTINUED | OUTPATIENT
Start: 2023-10-17 | End: 2023-10-17 | Stop reason: HOSPADM

## 2023-10-17 RX ORDER — MAGNESIUM HYDROXIDE 1200 MG/15ML
LIQUID ORAL AS NEEDED
Status: DISCONTINUED | OUTPATIENT
Start: 2023-10-17 | End: 2023-10-17 | Stop reason: HOSPADM

## 2023-10-17 RX ORDER — SODIUM CHLORIDE 0.9 % (FLUSH) 0.9 %
3 SYRINGE (ML) INJECTION EVERY 12 HOURS SCHEDULED
Status: DISCONTINUED | OUTPATIENT
Start: 2023-10-17 | End: 2023-10-17 | Stop reason: HOSPADM

## 2023-10-17 RX ORDER — DEXAMETHASONE SODIUM PHOSPHATE 4 MG/ML
INJECTION, SOLUTION INTRA-ARTICULAR; INTRALESIONAL; INTRAMUSCULAR; INTRAVENOUS; SOFT TISSUE AS NEEDED
Status: DISCONTINUED | OUTPATIENT
Start: 2023-10-17 | End: 2023-10-17 | Stop reason: SURG

## 2023-10-17 RX ADMIN — FENTANYL CITRATE 50 MCG: 50 INJECTION, SOLUTION INTRAMUSCULAR; INTRAVENOUS at 08:07

## 2023-10-17 RX ADMIN — HYDROMORPHONE HYDROCHLORIDE 0.5 MG: 1 INJECTION, SOLUTION INTRAMUSCULAR; INTRAVENOUS; SUBCUTANEOUS at 15:23

## 2023-10-17 RX ADMIN — HYDROMORPHONE HYDROCHLORIDE 0.5 MG: 1 INJECTION, SOLUTION INTRAMUSCULAR; INTRAVENOUS; SUBCUTANEOUS at 10:03

## 2023-10-17 RX ADMIN — INSULIN LISPRO 3 UNITS: 100 INJECTION, SOLUTION INTRAVENOUS; SUBCUTANEOUS at 21:12

## 2023-10-17 RX ADMIN — METFORMIN HYDROCHLORIDE 2000 MG: 500 TABLET, EXTENDED RELEASE ORAL at 15:23

## 2023-10-17 RX ADMIN — PROPOFOL 200 MG: 10 INJECTION, EMULSION INTRAVENOUS at 07:41

## 2023-10-17 RX ADMIN — HYDROMORPHONE HYDROCHLORIDE 0.5 MG: 1 INJECTION, SOLUTION INTRAMUSCULAR; INTRAVENOUS; SUBCUTANEOUS at 23:26

## 2023-10-17 RX ADMIN — SODIUM CHLORIDE 100 ML/HR: 9 INJECTION, SOLUTION INTRAVENOUS at 13:33

## 2023-10-17 RX ADMIN — SODIUM CHLORIDE 100 ML/HR: 9 INJECTION, SOLUTION INTRAVENOUS at 21:11

## 2023-10-17 RX ADMIN — ROCURONIUM BROMIDE 50 MG: 10 INJECTION, SOLUTION INTRAVENOUS at 07:43

## 2023-10-17 RX ADMIN — SODIUM CHLORIDE, POTASSIUM CHLORIDE, SODIUM LACTATE AND CALCIUM CHLORIDE 9 ML/HR: 600; 310; 30; 20 INJECTION, SOLUTION INTRAVENOUS at 07:03

## 2023-10-17 RX ADMIN — ROCURONIUM BROMIDE 20 MG: 10 INJECTION, SOLUTION INTRAVENOUS at 08:39

## 2023-10-17 RX ADMIN — KETOROLAC TROMETHAMINE 15 MG: 30 INJECTION, SOLUTION INTRAMUSCULAR at 21:12

## 2023-10-17 RX ADMIN — SUGAMMADEX 200 MG: 100 INJECTION, SOLUTION INTRAVENOUS at 09:32

## 2023-10-17 RX ADMIN — HYDROMORPHONE HYDROCHLORIDE 0.5 MG: 1 INJECTION, SOLUTION INTRAMUSCULAR; INTRAVENOUS; SUBCUTANEOUS at 10:14

## 2023-10-17 RX ADMIN — FAMOTIDINE 20 MG: 10 INJECTION INTRAVENOUS at 07:02

## 2023-10-17 RX ADMIN — DEXAMETHASONE SODIUM PHOSPHATE 10 MG: 4 INJECTION, SOLUTION INTRAMUSCULAR; INTRAVENOUS at 07:49

## 2023-10-17 RX ADMIN — CEFAZOLIN SODIUM 2000 MG: 2 INJECTION, SOLUTION INTRAVENOUS at 15:56

## 2023-10-17 RX ADMIN — HYDROMORPHONE HYDROCHLORIDE 0.5 MG: 1 INJECTION, SOLUTION INTRAMUSCULAR; INTRAVENOUS; SUBCUTANEOUS at 10:33

## 2023-10-17 RX ADMIN — CEFAZOLIN SODIUM 2000 MG: 2 INJECTION, SOLUTION INTRAVENOUS at 23:25

## 2023-10-17 RX ADMIN — SODIUM CHLORIDE, POTASSIUM CHLORIDE, SODIUM LACTATE AND CALCIUM CHLORIDE: 600; 310; 30; 20 INJECTION, SOLUTION INTRAVENOUS at 08:51

## 2023-10-17 RX ADMIN — ONDANSETRON 4 MG: 2 INJECTION INTRAMUSCULAR; INTRAVENOUS at 07:42

## 2023-10-17 RX ADMIN — CEFAZOLIN SODIUM 2000 MG: 2 INJECTION, SOLUTION INTRAVENOUS at 07:28

## 2023-10-17 RX ADMIN — DEXTROSE MONOHYDRATE 12.5 G: 25 INJECTION, SOLUTION INTRAVENOUS at 08:29

## 2023-10-17 RX ADMIN — HYDROMORPHONE HYDROCHLORIDE 0.5 MG: 1 INJECTION, SOLUTION INTRAMUSCULAR; INTRAVENOUS; SUBCUTANEOUS at 08:39

## 2023-10-17 RX ADMIN — LIDOCAINE HYDROCHLORIDE 100 MG: 20 INJECTION, SOLUTION INFILTRATION; PERINEURAL at 07:41

## 2023-10-17 RX ADMIN — OXYCODONE HYDROCHLORIDE AND ACETAMINOPHEN 1 TABLET: 5; 325 TABLET ORAL at 19:24

## 2023-10-17 RX ADMIN — ONDANSETRON 4 MG: 2 INJECTION INTRAMUSCULAR; INTRAVENOUS at 21:24

## 2023-10-17 RX ADMIN — OXYCODONE AND ACETAMINOPHEN 1 TABLET: 7.5; 325 TABLET ORAL at 10:31

## 2023-10-17 RX ADMIN — PROPOFOL 100 MG: 10 INJECTION, EMULSION INTRAVENOUS at 08:39

## 2023-10-17 RX ADMIN — KETOROLAC TROMETHAMINE 15 MG: 30 INJECTION, SOLUTION INTRAMUSCULAR at 15:57

## 2023-10-17 NOTE — ANESTHESIA PREPROCEDURE EVALUATION
Anesthesia Evaluation     Patient summary reviewed and Nursing notes reviewed                Airway   Mallampati: III  Possible difficult intubation  Dental - normal exam     Pulmonary - normal exam   (+) asthma,sleep apnea on CPAP  (-) not a smoker  Cardiovascular - normal exam    ECG reviewed    (+) hypertension, hyperlipidemia    ROS comment: Reviewed stress test and cath reports:    ? Findings consistent with a normal ECG stress test.  ? The patient was quite tachycardic at baseline, and reached 100% of his age predicted maximum heart rate.         Neuro/Psych- negative ROS  GI/Hepatic/Renal/Endo    (+) obesity, GERD, renal disease stones, diabetes mellitus type 2    Musculoskeletal     Abdominal   (+) obese   Substance History      OB/GYN          Other                          Anesthesia Plan    ASA 3     general       Anesthetic plan, risks, benefits, and alternatives have been provided, discussed and informed consent has been obtained with: patient.        CODE STATUS:

## 2023-10-17 NOTE — PLAN OF CARE
Goal Outcome Evaluation:  Plan of Care Reviewed With: patient           Outcome Evaluation: Patient from PACU this afternoon. AOx4. X2 L-MARK drains with sanguinous output. Some drainage around MARK sites. PRN pain medication given x1. Tolerating regular diet. NS infusing 100mL/hr. Receiving ancef Q8H. VSS.

## 2023-10-17 NOTE — OP NOTE
PREOPERATIVE DIAGNOSIS:  Recurrent incisional hernia    POSTOPERATIVE DIAGNOSIS (FINDINGS):  Recurrent incisional hernia located in the supraumbilical midline with 11 cm fascial defect    PROCEDURE:  Open recurrent reducible incisional hernia repair, 11 cm fascial defect  Bilateral component separation    SURGEON:  Hamzah Perea MD    ASSISTANT:  Giulia Nina was responsible for performing the following activities: suction, irrigation, suturing, closing, retraction, and placing dressing, and their skilled assistance was necessary for the success of this case.    ANESTHESIA:  General    EBL:  Minimal    SPECIMEN(S):  Hernia sac    DESCRIPTION:  In supine position under general anesthetic prepped and draped usual sterile manner.  Transverse elliptical incision was made to excise redundant skin and this was carried to the hernia sac circumferentially.  The hernia sac was circumferentially dissected to the level of the fascia circumferentially.  The hernia sac was opened and the incarcerated bowel was easily reduced.  The hernia sac was  from the fascia circumferentially with electrocautery and the fascia was then elevated anteriorly.  The fascial defect measured 11 cm.  I opened the posterior rectus sheath bilaterally approximately 1 cm past the midline and  the posterior rectus sheath from the rectus abdominis to the lateral edge of the rectus abdominis bilaterally.  I then proceeded to divide the transverse abdominis muscle from the costal margin past the arcuate line on the left.  This resulted in excellent mobilization of the midline fascia and the peritoneal cavity was not entered in the process of division of the muscle.  I was then able to close the posterior rectus sheath without tension using running 0 Nurolon suture.  A 15 x 15 cm polypropylene mesh was placed into the preperitoneal plane.  The mesh was sutured circumferentially to the posterior fascia with interrupted 0 Nurolon sutures.   Good hemostasis was noted.  The anterior rectus sheath was then closed over the mesh with running 0 Nurolon incorporating bites of the mesh in the closure.  A 19 [place] drain was placed in the preperitoneal plane prior to complete closure of the anterior fascia.  A second 19 [place] drain was placed in the subcutaneous layer.  Good hemostasis was ensured.  Skin was then closed with interrupted 3-0 Vicryl deep dermal suture followed by staples.  Sterile dressing applied.  Tolerated well.    Hamzah Perea M.D.

## 2023-10-17 NOTE — ANESTHESIA POSTPROCEDURE EVALUATION
"Patient: Jose Almazan    Procedure Summary       Date: 10/17/23 Room / Location: [facility] OR  / [facility] MAIN OR    Anesthesia Start: 0732 Anesthesia Stop: 0949    Procedure: OPEN RECURRENT INCISIONAL HERNIA REPAIR WITH MESH (Abdomen) Diagnosis:       Incisional hernia, without obstruction or gangrene      (Incisional hernia, without obstruction or gangrene [K43.2])    Surgeons: Hamzah Perea MD Provider: Jose Powers MD    Anesthesia Type: general ASA Status: 3            Anesthesia Type: general    Vitals  Vitals Value Taken Time   /80 10/17/23 1100   Temp 36.9 °C (98.4 °F) 10/17/23 0946   Pulse 117 10/17/23 1107   Resp 16 10/17/23 1030   SpO2 98 % 10/17/23 1107   Vitals shown include unfiled device data.        Post Anesthesia Care and Evaluation    Patient location during evaluation: PACU  Patient participation: complete - patient participated  Level of consciousness: awake  Pain management: adequate    Airway patency: patent  Anesthetic complications: No anesthetic complications    Cardiovascular status: acceptable  Respiratory status: acceptable  Hydration status: acceptable    Comments: /81   Pulse 106   Temp 36.9 °C (98.4 °F) (Oral)   Resp 16   Ht 174 cm (68.5\")   Wt 97.1 kg (214 lb)   SpO2 98%   BMI 32.07 kg/m²       "

## 2023-10-17 NOTE — H&P
ASSESSMENT/PLAN:    53-year-old gentleman with symptomatic incisional hernia, presents for repair.  He has lost a substantial amount of weight since I initially saw him in March 2020.  He understands rationale for the procedure, the nature of the procedure and the risks including but not limited to bleeding, infection, use of mesh, and recurrence.  All questions were answered and he wishes to proceed as outlined.    CC:     Incisional hernia    HPI:    53-year-old gentleman with symptomatic incisional hernia.  Relevant review of systems negative other than presenting complaints.    ENDOSCOPY  EGD 10/19/2022: Normal esophagus.  Mild inflammation of gastric antrum.  Normal duodenum.  Biopsies of gastric antrum showed mild reactive/chemical gastropathy and repair, negative for H. pylori.  Colonoscopy 10/19/2022: Normal    LABS:  CBC 10/9/2023: Hemoglobin 12.4, otherwise normal  BMP 10/9/2023: Normal    SOCIAL HISTORY:   Denies tobacco use  Occasional alcohol use    FAMILY HISTORY:    Colorectal cancer: Negative    PREVIOUS ABDOMINAL SURGERY    Laparoscopic cholecystectomy  Both laparoscopic and open incisional hernia repairs x3    PAST MEDICAL HISTORY:    Arthritis  Asthma  Diabetes  Gastroesophageal reflux disease  Hypertension  Hyperlipidemia  Obesity  Sleep apnea    MEDICATIONS:   Aspirin 325 mg  Celebrex  Farxiga  Insulin  Avapro  Glucophage  Prilosec  Ozempic  Rosuvastatin    ALLERGIES:   None    PHYSICAL EXAM:   Constitutional: No acute distress  Vital signs:   Weight: 214 pounds  Height: 68 inches  BMI: 32.1  Blood pressure 114/72  Heart rate 89 respiratory rate 18  Temperature 97.8  Respiratory: Normal nonlabored inspiratory effort  Cardiovascular: Regular rate, no jugular venous distention  Gastrointestinal: Soft, nontender, large ventral hernia with defect above the umbilicus.  Defect is much smaller than the herniated content which is fully reducible.  Psychiatric: Alert, oriented, normal affect     SRUTHI  AN MENDOZA.

## 2023-10-17 NOTE — ANESTHESIA PROCEDURE NOTES
Airway  Urgency: elective    Date/Time: 10/17/2023 7:46 AM  Airway not difficult    General Information and Staff    Patient location during procedure: OR  CRNA/CAA: Duy Delgado, HALI    Indications and Patient Condition  Indications for airway management: airway protection    Preoxygenated: yes  MILS maintained throughout  Mask difficulty assessment: 2 - vent by mask + OA or adjuvant +/- NMBA    Final Airway Details  Final airway type: endotracheal airway      Successful airway: ETT  Cuffed: yes   Successful intubation technique: direct laryngoscopy  Facilitating devices/methods: intubating stylet and anterior pressure/BURP  Endotracheal tube insertion site: oral  Blade: Magdalena  Blade size: 3  ETT size (mm): 7.5  Cormack-Lehane Classification: grade III - view of epiglottis only  Placement verified by: chest auscultation and capnometry   Measured from: gums  ETT/EBT to gums (cm): 23  Number of attempts at approach: 1  Assessment: lips, teeth, and gum same as pre-op and atraumatic intubation

## 2023-10-18 LAB
ANION GAP SERPL CALCULATED.3IONS-SCNC: 8.8 MMOL/L (ref 5–15)
BUN SERPL-MCNC: 26 MG/DL (ref 6–20)
BUN/CREAT SERPL: 19.7 (ref 7–25)
CALCIUM SPEC-SCNC: 8.7 MG/DL (ref 8.6–10.5)
CHLORIDE SERPL-SCNC: 100 MMOL/L (ref 98–107)
CO2 SERPL-SCNC: 23.2 MMOL/L (ref 22–29)
CREAT SERPL-MCNC: 1.32 MG/DL (ref 0.76–1.27)
DEPRECATED RDW RBC AUTO: 41.7 FL (ref 37–54)
EGFRCR SERPLBLD CKD-EPI 2021: 64.1 ML/MIN/1.73
ERYTHROCYTE [DISTWIDTH] IN BLOOD BY AUTOMATED COUNT: 12.4 % (ref 12.3–15.4)
GLUCOSE BLDC GLUCOMTR-MCNC: 118 MG/DL (ref 70–130)
GLUCOSE BLDC GLUCOMTR-MCNC: 148 MG/DL (ref 70–130)
GLUCOSE BLDC GLUCOMTR-MCNC: 173 MG/DL (ref 70–130)
GLUCOSE SERPL-MCNC: 117 MG/DL (ref 65–99)
HCT VFR BLD AUTO: 28 % (ref 37.5–51)
HGB BLD-MCNC: 9.7 G/DL (ref 13–17.7)
LAB AP CASE REPORT: NORMAL
MCH RBC QN AUTO: 32.3 PG (ref 26.6–33)
MCHC RBC AUTO-ENTMCNC: 34.6 G/DL (ref 31.5–35.7)
MCV RBC AUTO: 93.3 FL (ref 79–97)
PATH REPORT.FINAL DX SPEC: NORMAL
PATH REPORT.GROSS SPEC: NORMAL
PLATELET # BLD AUTO: 189 10*3/MM3 (ref 140–450)
PMV BLD AUTO: 9.5 FL (ref 6–12)
POTASSIUM SERPL-SCNC: 4.5 MMOL/L (ref 3.5–5.2)
RBC # BLD AUTO: 3 10*6/MM3 (ref 4.14–5.8)
SODIUM SERPL-SCNC: 132 MMOL/L (ref 136–145)
WBC NRBC COR # BLD: 6.21 10*3/MM3 (ref 3.4–10.8)

## 2023-10-18 PROCEDURE — 80048 BASIC METABOLIC PNL TOTAL CA: CPT | Performed by: SURGERY

## 2023-10-18 PROCEDURE — 85027 COMPLETE CBC AUTOMATED: CPT | Performed by: SURGERY

## 2023-10-18 PROCEDURE — 63710000001 INSULIN LISPRO (HUMAN) PER 5 UNITS: Performed by: SURGERY

## 2023-10-18 PROCEDURE — 25010000002 HYDROMORPHONE PER 4 MG: Performed by: SURGERY

## 2023-10-18 PROCEDURE — 82948 REAGENT STRIP/BLOOD GLUCOSE: CPT

## 2023-10-18 PROCEDURE — G0378 HOSPITAL OBSERVATION PER HR: HCPCS

## 2023-10-18 PROCEDURE — 25010000002 KETOROLAC TROMETHAMINE PER 15 MG: Performed by: SURGERY

## 2023-10-18 RX ADMIN — METFORMIN HYDROCHLORIDE 2000 MG: 500 TABLET, EXTENDED RELEASE ORAL at 08:25

## 2023-10-18 RX ADMIN — KETOROLAC TROMETHAMINE 15 MG: 30 INJECTION, SOLUTION INTRAMUSCULAR at 05:05

## 2023-10-18 RX ADMIN — INSULIN LISPRO 3 UNITS: 100 INJECTION, SOLUTION INTRAVENOUS; SUBCUTANEOUS at 22:05

## 2023-10-18 RX ADMIN — OXYCODONE HYDROCHLORIDE AND ACETAMINOPHEN 1 TABLET: 5; 325 TABLET ORAL at 22:05

## 2023-10-18 RX ADMIN — OXYCODONE HYDROCHLORIDE AND ACETAMINOPHEN 1 TABLET: 5; 325 TABLET ORAL at 17:47

## 2023-10-18 RX ADMIN — PANTOPRAZOLE SODIUM 40 MG: 40 TABLET, DELAYED RELEASE ORAL at 05:05

## 2023-10-18 RX ADMIN — INSULIN LISPRO 3 UNITS: 100 INJECTION, SOLUTION INTRAVENOUS; SUBCUTANEOUS at 17:35

## 2023-10-18 RX ADMIN — HYDROMORPHONE HYDROCHLORIDE 0.5 MG: 1 INJECTION, SOLUTION INTRAMUSCULAR; INTRAVENOUS; SUBCUTANEOUS at 10:33

## 2023-10-18 NOTE — PROGRESS NOTES
Postoperative day 1 open incisional hernia repair    Pain well controlled.  No nausea or vomiting.    Afebrile, vital signs stable.  MARK drains serosanguineous.  Abdomen soft, dressing dry.    WBC 6.2, hemoglobin 9.7  GFR 64.1    With increasing creatinine, will DC Toradol  Recheck labs in a.m.  Otherwise doing well

## 2023-10-18 NOTE — PLAN OF CARE
Goal Outcome Evaluation:  Plan of Care Reviewed With: patient        Progress: no change  Outcome Evaluation: Pt alert and orienteed x4. VSS on room air. CPAP worn while asleep. Pt has ambulated in room this shift. C/o incisional pain treated per MAR. IV abx infused as ordered. NS infusing at 100mL/hr. x2 MARK drains to L side with sanguinous output. Minimal drainage present. No needs voiced at this time. Call light andd personal items within reach.

## 2023-10-18 NOTE — PLAN OF CARE
Goal Outcome Evaluation:  Plan of Care Reviewed With: patient        Progress: improving  Outcome Evaluation: AOX4. MARK drains remain in place; output decreasing. Dressing change to abd this evening. Ambulation and IS encouraged. Patient ambulating with SBA. VSS.

## 2023-10-19 LAB
ANION GAP SERPL CALCULATED.3IONS-SCNC: 9 MMOL/L (ref 5–15)
BUN SERPL-MCNC: 21 MG/DL (ref 6–20)
BUN/CREAT SERPL: 24.1 (ref 7–25)
CALCIUM SPEC-SCNC: 8.7 MG/DL (ref 8.6–10.5)
CHLORIDE SERPL-SCNC: 100 MMOL/L (ref 98–107)
CO2 SERPL-SCNC: 28 MMOL/L (ref 22–29)
CREAT SERPL-MCNC: 0.87 MG/DL (ref 0.76–1.27)
DEPRECATED RDW RBC AUTO: 42.5 FL (ref 37–54)
EGFRCR SERPLBLD CKD-EPI 2021: 102.5 ML/MIN/1.73
ERYTHROCYTE [DISTWIDTH] IN BLOOD BY AUTOMATED COUNT: 12.3 % (ref 12.3–15.4)
GLUCOSE BLDC GLUCOMTR-MCNC: 117 MG/DL (ref 70–130)
GLUCOSE BLDC GLUCOMTR-MCNC: 187 MG/DL (ref 70–130)
GLUCOSE BLDC GLUCOMTR-MCNC: 190 MG/DL (ref 70–130)
GLUCOSE BLDC GLUCOMTR-MCNC: 196 MG/DL (ref 70–130)
GLUCOSE BLDC GLUCOMTR-MCNC: 210 MG/DL (ref 70–130)
GLUCOSE SERPL-MCNC: 119 MG/DL (ref 65–99)
HCT VFR BLD AUTO: 28.5 % (ref 37.5–51)
HGB BLD-MCNC: 9.5 G/DL (ref 13–17.7)
MCH RBC QN AUTO: 31.6 PG (ref 26.6–33)
MCHC RBC AUTO-ENTMCNC: 33.3 G/DL (ref 31.5–35.7)
MCV RBC AUTO: 94.7 FL (ref 79–97)
PLATELET # BLD AUTO: 170 10*3/MM3 (ref 140–450)
PMV BLD AUTO: 9.5 FL (ref 6–12)
POTASSIUM SERPL-SCNC: 4.4 MMOL/L (ref 3.5–5.2)
RBC # BLD AUTO: 3.01 10*6/MM3 (ref 4.14–5.8)
SODIUM SERPL-SCNC: 137 MMOL/L (ref 136–145)
WBC NRBC COR # BLD: 4.87 10*3/MM3 (ref 3.4–10.8)

## 2023-10-19 PROCEDURE — G0378 HOSPITAL OBSERVATION PER HR: HCPCS

## 2023-10-19 PROCEDURE — 82948 REAGENT STRIP/BLOOD GLUCOSE: CPT

## 2023-10-19 PROCEDURE — 85027 COMPLETE CBC AUTOMATED: CPT | Performed by: SURGERY

## 2023-10-19 PROCEDURE — 63710000001 INSULIN LISPRO (HUMAN) PER 5 UNITS: Performed by: SURGERY

## 2023-10-19 PROCEDURE — 80048 BASIC METABOLIC PNL TOTAL CA: CPT | Performed by: SURGERY

## 2023-10-19 RX ADMIN — OXYCODONE HYDROCHLORIDE AND ACETAMINOPHEN 1 TABLET: 5; 325 TABLET ORAL at 21:23

## 2023-10-19 RX ADMIN — INSULIN LISPRO 3 UNITS: 100 INJECTION, SOLUTION INTRAVENOUS; SUBCUTANEOUS at 12:34

## 2023-10-19 RX ADMIN — INSULIN LISPRO 3 UNITS: 100 INJECTION, SOLUTION INTRAVENOUS; SUBCUTANEOUS at 21:21

## 2023-10-19 RX ADMIN — PANTOPRAZOLE SODIUM 40 MG: 40 TABLET, DELAYED RELEASE ORAL at 06:27

## 2023-10-19 RX ADMIN — INSULIN LISPRO 3 UNITS: 100 INJECTION, SOLUTION INTRAVENOUS; SUBCUTANEOUS at 17:48

## 2023-10-19 RX ADMIN — OXYCODONE HYDROCHLORIDE AND ACETAMINOPHEN 1 TABLET: 5; 325 TABLET ORAL at 10:57

## 2023-10-19 RX ADMIN — METFORMIN HYDROCHLORIDE 2000 MG: 500 TABLET, EXTENDED RELEASE ORAL at 08:13

## 2023-10-19 NOTE — CASE MANAGEMENT/SOCIAL WORK
Discharge Planning Assessment  [facility]     Patient Name: Jose Almazan  MRN: [id number]  Today's Date: 10/19/2023    Admit Date: 10/17/2023    Plan: Home with family to transport   Discharge Needs Assessment       Row Name 10/19/23 1455       Living Environment    People in Home spouse    Current Living Arrangements home    Potentially Unsafe Housing Conditions none    Primary Care Provided by self    Provides Primary Care For no one    Family Caregiver if Needed spouse    Family Caregiver Names Merary [phone]    Quality of Family Relationships unable to assess    Able to Return to Prior Arrangements yes       Resource/Environmental Concerns    Resource/Environmental Concerns none    Transportation Concerns none       Transition Planning    Patient/Family Anticipates Transition to home with family    Patient/Family Anticipated Services at Transition     Transportation Anticipated family or friend will provide       Discharge Needs Assessment    Readmission Within the Last 30 Days no previous admission in last 30 days    Equipment Currently Used at Home cpap;glucometer    Concerns to be Addressed denies needs/concerns at this time;discharge planning    Anticipated Changes Related to Illness none    Equipment Needed After Discharge none                   Discharge Plan       Row Name 10/19/23 1456       Plan    Plan Home with family to transport    Roadmap to Recovery Yes    Patient/Family in Agreement with Plan yes    Provided Post Acute Provider List? Yes    Post Acute Provider List Home Health    Provided Post Acute Provider Quality & Resource List? Yes    Post Acute Provider Quality and Resource List Home Health    Delivered To Patient    Method of Delivery In person    Plan Comments Spoke with patient at bedside. Introduced self and explained CCP role. Verified face sheet and local pharmacy is [facility]; enrolled in B. Patient denies difficulty with medication cost/ management. Patient lives  with spouse/ Merary in s/s home with 3 MARKUS. Patient denies prior HH and SNF history. Patient has Cpap, denies further DME. Patient is IADL And still drives. Patient plans to return home with family to transport. Patient given road to recovery and HH/ SNF list. Patient denies discharge needs.                  Continued Care and Services - Admitted Since 10/17/2023    Coordination has not been started for this encounter.       Expected Discharge Date and Time       Expected Discharge Date Expected Discharge Time    Oct 20, 2023            Demographic Summary       Row Name 10/19/23 1454       General Information    Admission Type observation    Referral Source admission list    Reason for Consult discharge planning    Preferred Language English                   Functional Status       Row Name 10/19/23 1455       Functional Status    Usual Activity Tolerance good    Current Activity Tolerance moderate       Functional Status, IADL    Medications independent    Meal Preparation independent    Housekeeping independent    Laundry independent    Shopping independent       Mental Status    General Appearance WDL WDL       Mental Status Summary    Recent Changes in Mental Status/Cognitive Functioning no changes       Employment/    Employment Status retired                   Psychosocial    No documentation.                  Abuse/Neglect    No documentation.                  Legal    No documentation.                  Substance Abuse    No documentation.                  Patient Forms    No documentation.                     Dulce Quiñones RN

## 2023-10-19 NOTE — PLAN OF CARE
Goal Outcome Evaluation:  Plan of Care Reviewed With: patient        Progress: improving  Outcome Evaluation: AOX4. Abd incision JOANA; abd binder provided. X2 MARK drains remain in place. Pt ambulating independently. PRN pain medication given x1. VSS.

## 2023-10-19 NOTE — PLAN OF CARE
Goal Outcome Evaluation:    Patient VSS with pain well controlled with current analgesic regimen.  Abdominal dressing with no drainage and minimal drainage via MARK drains.  Patient remains A+O x 4, on RA with safety protocols in place.  Patient hoping to be discharged to home with spouse later today.  RN will continue to monitor patient's status.       Problem: Adult Inpatient Plan of Care  Goal: Plan of Care Review  Outcome: Ongoing, Progressing  Goal: Patient-Specific Goal (Individualized)  Outcome: Ongoing, Progressing  Goal: Absence of Hospital-Acquired Illness or Injury  Outcome: Ongoing, Progressing  Intervention: Identify and Manage Fall Risk  Recent Flowsheet Documentation  Taken 10/19/2023 0400 by Beulah Johnson RN  Safety Promotion/Fall Prevention:   safety round/check completed   assistive device/personal items within reach   clutter free environment maintained   fall prevention program maintained  Taken 10/19/2023 0200 by Beulah Johnson RN  Safety Promotion/Fall Prevention:   safety round/check completed   assistive device/personal items within reach   clutter free environment maintained   fall prevention program maintained  Taken 10/19/2023 0000 by Beulah Johnson RN  Safety Promotion/Fall Prevention:   safety round/check completed   assistive device/personal items within reach   clutter free environment maintained   fall prevention program maintained  Taken 10/18/2023 2205 by Beulah Johnson RN  Safety Promotion/Fall Prevention:   safety round/check completed   assistive device/personal items within reach   clutter free environment maintained   fall prevention program maintained  Taken 10/18/2023 2200 by Beulah Johnson RN  Safety Promotion/Fall Prevention:   safety round/check completed   assistive device/personal items within reach   clutter free environment maintained   fall prevention program maintained  Taken 10/18/2023 2000 by Beulah Johnson RN  Safety Promotion/Fall Prevention:   safety round/check  completed   assistive device/personal items within reach   clutter free environment maintained   fall prevention program maintained  Intervention: Prevent Skin Injury  Recent Flowsheet Documentation  Taken 10/18/2023 2205 by Beulah Johnson RN  Body Position: sitting up in bed  Intervention: Prevent and Manage VTE (Venous Thromboembolism) Risk  Recent Flowsheet Documentation  Taken 10/18/2023 2205 by Beulah Johnson, RN  Activity Management: ambulated in room  Goal: Optimal Comfort and Wellbeing  Outcome: Ongoing, Progressing  Intervention: Monitor Pain and Promote Comfort  Recent Flowsheet Documentation  Taken 10/18/2023 2205 by Beulah Johnson RN  Pain Management Interventions: see MAR  Intervention: Provide Person-Centered Care  Recent Flowsheet Documentation  Taken 10/18/2023 2205 by Beulah Johnson RN  Trust Relationship/Rapport:   care explained   choices provided   emotional support provided   empathic listening provided   questions answered   questions encouraged   reassurance provided   thoughts/feelings acknowledged  Goal: Readiness for Transition of Care  Outcome: Ongoing, Progressing     Problem: Diabetes Comorbidity  Goal: Blood Glucose Level Within Targeted Range  Outcome: Ongoing, Progressing     Problem: Hypertension Comorbidity  Goal: Blood Pressure in Desired Range  Outcome: Ongoing, Progressing     Problem: Obstructive Sleep Apnea Risk or Actual Comorbidity Management  Goal: Unobstructed Breathing During Sleep  Outcome: Ongoing, Progressing

## 2023-10-20 ENCOUNTER — TELEPHONE (OUTPATIENT)
Dept: SURGERY | Facility: CLINIC | Age: 53
End: 2023-10-20
Payer: COMMERCIAL

## 2023-10-20 ENCOUNTER — READMISSION MANAGEMENT (OUTPATIENT)
Dept: CALL CENTER | Facility: HOSPITAL | Age: 53
End: 2023-10-20
Payer: COMMERCIAL

## 2023-10-20 VITALS
OXYGEN SATURATION: 97 % | RESPIRATION RATE: 20 BRPM | SYSTOLIC BLOOD PRESSURE: 119 MMHG | WEIGHT: 214 LBS | BODY MASS INDEX: 31.7 KG/M2 | HEART RATE: 95 BPM | HEIGHT: 69 IN | DIASTOLIC BLOOD PRESSURE: 70 MMHG | TEMPERATURE: 98.3 F

## 2023-10-20 LAB — GLUCOSE BLDC GLUCOMTR-MCNC: 146 MG/DL (ref 70–130)

## 2023-10-20 PROCEDURE — G0378 HOSPITAL OBSERVATION PER HR: HCPCS

## 2023-10-20 PROCEDURE — 82948 REAGENT STRIP/BLOOD GLUCOSE: CPT

## 2023-10-20 RX ORDER — OXYCODONE HYDROCHLORIDE AND ACETAMINOPHEN 5; 325 MG/1; MG/1
1 TABLET ORAL EVERY 6 HOURS PRN
Qty: 10 TABLET | Refills: 0 | Status: SHIPPED | OUTPATIENT
Start: 2023-10-20 | End: 2023-10-25

## 2023-10-20 RX ADMIN — LOSARTAN POTASSIUM 100 MG: 100 TABLET, FILM COATED ORAL at 09:00

## 2023-10-20 RX ADMIN — OXYCODONE HYDROCHLORIDE AND ACETAMINOPHEN 1 TABLET: 5; 325 TABLET ORAL at 09:02

## 2023-10-20 RX ADMIN — METFORMIN HYDROCHLORIDE 2000 MG: 500 TABLET, EXTENDED RELEASE ORAL at 09:00

## 2023-10-20 RX ADMIN — PANTOPRAZOLE SODIUM 40 MG: 40 TABLET, DELAYED RELEASE ORAL at 06:55

## 2023-10-20 NOTE — CASE MANAGEMENT/SOCIAL WORK
Case Management Discharge Note      Final Note: dc home    Provided Post Acute Provider List?: Yes  Post Acute Provider List: Home Health  Provided Post Acute Provider Quality & Resource List?: Yes  Post Acute Provider Quality and Resource List: Home Health  Delivered To: Patient  Method of Delivery: In person    Selected Continued Care - Discharged on 10/20/2023 Admission date: 10/17/2023 - Discharge disposition: Home or Self Care      Destination    No services have been selected for the patient.                Durable Medical Equipment    No services have been selected for the patient.                Dialysis/Infusion    No services have been selected for the patient.                Home Medical Care    No services have been selected for the patient.                Therapy    No services have been selected for the patient.                Community Resources    No services have been selected for the patient.                Community & DME    No services have been selected for the patient.                         Final Discharge Disposition Code: 01 - home or self-care

## 2023-10-20 NOTE — PLAN OF CARE
Goal Outcome Evaluation:      Discharge education complete all questions answered.

## 2023-10-20 NOTE — TELEPHONE ENCOUNTER
----- Message from Hamzah Perea MD sent at 10/20/2023  9:05 AM EDT -----  This gentleman needs to see me Wednesday in [place].

## 2023-10-21 NOTE — OUTREACH NOTE
Prep Survey      Flowsheet Row Responses   [facility] facility patient discharged from? [place]   Is LACE score < 7 ? No   Eligibility Readm Mgmt   Discharge diagnosis Incisional hernia, without obstruction or gangrene   Does the patient have one of the following disease processes/diagnoses(primary or secondary)? General Surgery   Does the patient have Home health ordered? No   Is there a DME ordered? No   Prep survey completed? Yes            TAMEKA FAUSTIN - Registered Nurse

## 2023-10-23 ENCOUNTER — READMISSION MANAGEMENT (OUTPATIENT)
Dept: CALL CENTER | Facility: HOSPITAL | Age: 53
End: 2023-10-23
Payer: COMMERCIAL

## 2023-10-23 NOTE — OUTREACH NOTE
General Surgery Week 1 Survey      Flowsheet Row Responses   [facility] patient discharged from? [place]   Does the patient have one of the following disease processes/diagnoses(primary or secondary)? General Surgery   Week 1 attempt successful? Yes   Call start time 1815   Call end time 1825   Discharge diagnosis Incisional hernia, without obstruction or gangrene   Meds reviewed with patient/caregiver? Yes   Is the patient having any side effects they believe may be caused by any medication additions or changes? No   Does the patient have all medications related to this admission filled (includes all antibiotics, pain medications, etc.) Yes   Is the patient taking all medications as directed (includes completed medication regime)? Yes   Does the patient have a follow up appointment scheduled with their surgeon? Yes   Has the patient kept scheduled appointments due by today? N/A   Comments Wed with surgeon   Psychosocial issues? No   Did the patient receive a copy of their discharge instructions? Yes   Nursing interventions Reviewed instructions with patient   What is the patient's perception of their health status since discharge? Improving   Nursing interventions Nurse provided patient education   Is the patient /caregiver able to teach back basic post-op care? Practice 'cough and deep breath', Continue use of incentive spirometry at least 1 week post discharge, Drive as instructed by MD in discharge instructions, No tub bath, swimming, or hot tub until instructed by MD, Keep incision areas clean,dry and protected, Lifting as instructed by MD in discharge instructions   Is the patient/caregiver able to teach back signs and symptoms of incisional infection? Fever, Pus or odor from incision, Incisional warmth, Increased drainage or bleeding, Increased redness, swelling or pain at the incisonal site   Is the patient/caregiver able to teach back steps to recovery at home? Set small, achievable goals for  return to baseline health, Rest and rebuild strength, gradually increase activity, Eat a well-balance diet, Make a list of questions for surgeon's appointment   If the patient is a current smoker, are they able to teach back resources for cessation? Not a smoker   Is the patient/caregiver able to teach back the hierarchy of who to call/visit for symptoms/problems? PCP, Specialist, Home health nurse, Urgent Care, ED, 911 Yes   Additional teach back comments States he is doing well.  Just hasn't gone to the bathroom.  He will increase fluids and if continues will notify surgeon.   Week 1 call completed? Yes   Graduated Yes   Graduated/Revoked comments Denies questions or needs at this time.   Call end time 2515            Viri PEREZ - Licensed Nurse

## 2023-10-24 NOTE — DISCHARGE SUMMARY
DATE OF ADMIT:    10/17/2023    DATE OF DISCHARGE:    10/20/2023    DIAGNOSIS:    Recurrent incisional hernia    PROCEDURES:    Open recurrent reducible incisional hernia repair, 11 cm fascial defect with bilateral component separation    SUMMARY OF HOSPITAL COURSE:     Uneventful postop course. Tolerating diet, incisions in good order and afebrile with stable vital signs at discharge. Prescribed oxycodone for pain.    DIET: Regular    ACTIVITY: Walking encouraged, no lifting or strenuous activity    MEDICATIONS: Refer to MAR    FOLLOW-UP: To call office and schedule 1 week follow-up appointment    Hamzah Perea M.D.

## 2023-10-25 ENCOUNTER — OFFICE VISIT (OUTPATIENT)
Dept: SURGERY | Facility: CLINIC | Age: 53
End: 2023-10-25
Payer: COMMERCIAL

## 2023-10-25 VITALS
HEIGHT: 69 IN | WEIGHT: 216 LBS | DIASTOLIC BLOOD PRESSURE: 72 MMHG | BODY MASS INDEX: 31.99 KG/M2 | SYSTOLIC BLOOD PRESSURE: 134 MMHG

## 2023-10-25 DIAGNOSIS — Z48.89 POSTOPERATIVE VISIT: Primary | ICD-10-CM

## 2023-10-25 PROCEDURE — 99024 POSTOP FOLLOW-UP VISIT: CPT | Performed by: SURGERY

## 2023-10-26 NOTE — PROGRESS NOTES
Postoperative visit    Open recurrent reducible incisional hernia repair with 11 cm fascial defect and bilateral component separation 10/17/2023    Pathology: Hernia sac    Office visit: Incision is healing very well with no evidence of infection.  Drains are both putting out less than 30 mL of serosanguineous fluid per 24-hour period.  Both drains were removed.  He will return in 1 week for staple removal.  Activity restrictions discussed.

## 2023-11-01 ENCOUNTER — CLINICAL SUPPORT (OUTPATIENT)
Dept: SURGERY | Facility: CLINIC | Age: 53
End: 2023-11-01
Payer: COMMERCIAL

## 2023-11-01 NOTE — PROGRESS NOTES
Patient presented to the office this afternoon for removal of staples.  Patient tolerated well removal of staples.  Incision appeared clean and well healed.  Steri strips applied using mastisol.

## 2023-12-13 ENCOUNTER — TELEPHONE (OUTPATIENT)
Dept: CARDIOLOGY | Facility: CLINIC | Age: 53
End: 2023-12-13
Payer: COMMERCIAL

## 2023-12-13 NOTE — TELEPHONE ENCOUNTER
Caller: Jose Almazan    Relationship to patient: Self    Best call back number:     [phone]       Chief complaint: PT CALLED HUB STATING THAT HE HAD NOT BEEN IN THE OFFICE IN A WHILE. PT WOULD  LIKE TO COME IN TO MAKE A FOLLOW UP APPOINTMENT WITH DR. CASTILLO.     Type of visit: PAST 1 YEAR FOLLOW UP     Requested date: NEXT  OPENING      If rescheduling, when is the original appointment: N/A

## 2023-12-18 ENCOUNTER — OFFICE VISIT (OUTPATIENT)
Dept: SURGERY | Facility: CLINIC | Age: 53
End: 2023-12-18
Payer: COMMERCIAL

## 2023-12-18 VITALS
HEIGHT: 69 IN | DIASTOLIC BLOOD PRESSURE: 74 MMHG | BODY MASS INDEX: 31.9 KG/M2 | WEIGHT: 215.4 LBS | SYSTOLIC BLOOD PRESSURE: 120 MMHG

## 2023-12-18 DIAGNOSIS — K43.2 INCISIONAL HERNIA, WITHOUT OBSTRUCTION OR GANGRENE: ICD-10-CM

## 2023-12-18 DIAGNOSIS — R22.2 SUBCUTANEOUS NODULE OF ABDOMINAL WALL: Primary | ICD-10-CM

## 2023-12-18 PROCEDURE — 99024 POSTOP FOLLOW-UP VISIT: CPT

## 2023-12-18 NOTE — PROGRESS NOTES
ASSESSMENT/PLAN:    53-year-old male status post open recurrent incisional hernia repair with mesh placement and bilateral component separation in 10/2023 with Dr. Perea. On the right, far lateral portion of the incision, there is an approximately 2 cm nodule. Reviewed with the patient this is most likely consistent with a hematoma or area of fat necrosis. The area was unable to be reduced, and is less likely to be a recurrent incarcerated hernia.  Discussed proceeding with an ultrasound for further evaluation. He would like to proceed. Order placed.  I will call him with the results and make further recommendations at that time.    CC:  Knot at incision site    HPI:    53-year-old male who presents today for evaluation of a knot near his incision.  He recently underwent an open recurrent incisional hernia repair with mesh placement and bilateral component separation in October by Dr. Perea.  He states a couple weeks ago he noticed a knot at the lateral portion of his incision. Denies any overlying skin changes. He has had some mild associated discomfort.  Denies any fevers or chills, nausea or vomiting. Reports normal bowel movements.    ENDOSCOPY:   EGD 10/19/2022 Dr. Xie  Colonoscopy 10/19/2022 Dr. Xie     RADIOLOGY:   No recent pertinent imaging    LABS:    10/19/2023 glucose 119, BUN 21, .5, hemoglobin 9.5    PAST MEDICAL HISTORY:    Hypertension  Hypercholesterolemia  Type 2 Diabetes Mellitus  GERD  Perirectal abscess   Incisional ventral hernia  GURDEEP  Rosacea  Arthritis  Obesity    SOCIAL HISTORY:   Denies tobacco use  Occasional alcohol use    FAMILY HISTORY:    Colorectal cancer: negative    PREVIOUS ABDOMINAL SURGERY:  Open recurrent incisional hernia repair (11cm fascial defect) with 15 x 15cm polypropylene mesh with bilateral component separation 10/17/2023 Dr. Perea  Laparoscopic robotic assisted incisional hernia repair 8/12/2019  Laparoscopic robotic assisted hernia repair and  "removal of infected mesh 12/20/2018  Laparoscopic repair of incarcerated umbilical incisional hernia repair 8/29/2016  Cholecystectomy 2008    OTHER SURGERY:  Cardiac catheterization 3/2019  LASIK  Tonsillectomy     ALLERGIES:   None    MEDICATIONS:   Aspirin 325mg  Celebrex  Farxiga  Novolog   Irbesartan  Metformin  Omeprazole  Ozempic  Rosuvastatin  Tresiba    ROS:    No cough, chest pain, shortness of air.  All other systems reviewed and negative other than presenting complaints.    PHYSICAL EXAM:   Constitutional: Well-developed, well-nourished, no acute distress  Vital signs:   Ht: 174cm (68.5\")  Wt: 97.7kg (215lb)  BMI: 32.28  Eyes: Conjunctiva normal, sclera nonicteric  ENMT: Hearing grossly normal, oral mucosa moist  Respiratory: Normal inspiratory effort  Cardiovascular: Regular rate, no peripheral edema, no jugular venous distention  Gastrointestinal: Soft, nontender, transverse incision spanning across lower abdomen, at the far right lateral portion of the incision, approximately 2cm nodule, no overlying skin changes   Skin: Warm, dry, no rash on visualized skin surfaces  Musculoskeletal: Symmetric strength, normal gait  Psychiatric: Alert and oriented ×3, normal affect     Stephy Allen PA-C    [facility] - General Surgery   [address], [address]  [place] [address]    [address], [address]  [place] [address]    Office: [phone]  Fax: [phone]   "

## 2023-12-22 ENCOUNTER — OFFICE VISIT (OUTPATIENT)
Dept: CARDIOLOGY | Facility: CLINIC | Age: 53
End: 2023-12-22
Payer: COMMERCIAL

## 2023-12-22 ENCOUNTER — HOSPITAL ENCOUNTER (OUTPATIENT)
Facility: HOSPITAL | Age: 53
Discharge: HOME | End: 2023-12-22
Payer: COMMERCIAL

## 2023-12-22 VITALS
WEIGHT: 212 LBS | SYSTOLIC BLOOD PRESSURE: 128 MMHG | BODY MASS INDEX: 31.77 KG/M2 | DIASTOLIC BLOOD PRESSURE: 83 MMHG | HEART RATE: 101 BPM

## 2023-12-22 DIAGNOSIS — I10 PRIMARY HYPERTENSION: Primary | ICD-10-CM

## 2023-12-22 DIAGNOSIS — G47.33 OSA (OBSTRUCTIVE SLEEP APNEA): ICD-10-CM

## 2023-12-22 DIAGNOSIS — R22.2 SUBCUTANEOUS NODULE OF ABDOMINAL WALL: ICD-10-CM

## 2023-12-22 DIAGNOSIS — E78.00 HYPERCHOLESTEROLEMIA: ICD-10-CM

## 2023-12-22 PROCEDURE — 76705 ECHO EXAM OF ABDOMEN: CPT

## 2023-12-22 PROCEDURE — 99214 OFFICE O/P EST MOD 30 MIN: CPT | Performed by: NURSE PRACTITIONER

## 2023-12-22 PROCEDURE — 93000 ELECTROCARDIOGRAM COMPLETE: CPT | Performed by: NURSE PRACTITIONER

## 2023-12-22 NOTE — PROGRESS NOTES
Date of Office Visit: 2023  Encounter Provider: JOSE Carrasco  Place of Service: [facility] CARDIOLOGY  Patient Name: Jose Almazan  :1970    Chief Complaint   Patient presents with    Follow-up    Hypertension   :   On is known to me from previous.  HPI: Jose Almazan is a 53 y.o. male who is a patient of Dr. Barry and is known to me today. He has a history of hyperlipidemia, GURDEEP obesity and type 2 diabetes mellitus. In 2022 she was evaluated for chest pain. There were negative cardiac enzymes and had a treadmill stress test that was negative and he was diagnosed with shingles.     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  Last year he started Ozempic for his diabetes and weight loss and he has lost over 60 pounds.  He feels great.  He recently had a hernia repair.  He has a small palpable area around his incision and this morning went for ultrasound.  Results are pending.  Blood pressures are running good in the 120s to 130s.  No dizziness, lightheadedness, chest pain, shortness of breath.  Previous testing and notes have been reviewed by me.   Past Medical History:   Diagnosis Date    Arthritis     Asthma     Cholelithiasis 2007    Gall bladder removed    COVID 2022    Diabetes mellitus     Fissure, anal 2018    Repaired    GERD (gastroesophageal reflux disease)     Hyperlipidemia     Hypertension     Kidney stones     Morbid obesity     has lost 56 lb since 2023    GURDEEP on CPAP     Skin abrasion     multiple  on hands and arms    Ventral hernia     still present  has had 3 other hernia surgeries       Past Surgical History:   Procedure Laterality Date    ABSCESS DRAINAGE N/A     rectal abscess    CARDIAC CATHETERIZATION N/A 2019    Procedure: Left Heart Cath;  Surgeon: Nikolai Bolanos MD;  Location: [facility] CATH INVASIVE LOCATION;  Service: Cardiology    CHOLECYSTECTOMY       COLONOSCOPY      COLONOSCOPY W/ POLYPECTOMY N/A 10/19/2022    Procedure: COLONOSCOPY TO CECUM;  Surgeon: Gasper Xie MD;  Location:  [facility];  Service: Gastroenterology;  Laterality: N/A;  PREOP/ CONSTIPATION  POSTOP/ NORMAL    ENDOSCOPY N/A 10/19/2022    Procedure: ESOPHAGOGASTRODUODENOSCOPY WITH BIOPSY;  Surgeon: Gasper Xie MD;  [facility];  Service: Gastroenterology;  Laterality: N/A;  PREOP/ HEARTBURN  POSTOP/ GASTRITIS    EYE SURGERY  2005    LASIK surgery    HERNIA MESH REMOVAL N/A 2018    removal of infected mesh & hernia repair    INCISIONAL HERNIA REPAIR N/A 2016    LAPAROSCOPIC REPAIR OF INCARCERATED INCISIONAL HERNIA CAUSING OBSTRUCTION WITH DUALMESH PLUS    INCISIONAL HERNIA REPAIR N/A 2019    KIDNEY STONE SURGERY      LASIK Bilateral 2006    TONSILLECTOMY Bilateral     VENTRAL/INCISIONAL HERNIA REPAIR N/A 10/17/2023    Procedure: OPEN RECURRENT INCISIONAL HERNIA REPAIR WITH MESH;  Surgeon: Hamzah Perea MD;  Location: [facility] MAIN OR;  Service: General;  Laterality: N/A;       Social History     Socioeconomic History    Marital status:    Tobacco Use    Smoking status: Never    Smokeless tobacco: Never    Tobacco comments:     caffeine use- diet coke, tea   Vaping Use    Vaping Use: Never used   Substance and Sexual Activity    Alcohol use: Yes     Alcohol/week: 6.0 standard drinks of alcohol     Types: 6 Cans of beer per week     Comment: Per month    Drug use: Never    Sexual activity: Not Currently     Partners: Female     Birth control/protection: Same-sex partner       Family History   Problem Relation Age of Onset    Heart disease Mother         Mother  from MI and perforated coronary artery during cath    Heart disease Father         Father with 4 vessel CABG in his late 40's.  Also with 2 MI's.    Diabetes Father     Down syndrome Sister     Heart disease Sister     Coronary artery disease Paternal Grandfather          Paternal GF with 3 MI's    Malig Hyperthermia Neg Hx        Review of Systems   Constitutional: Negative for diaphoresis and malaise/fatigue.   Cardiovascular:  Negative for chest pain, claudication, dyspnea on exertion, irregular heartbeat, leg swelling, near-syncope, orthopnea, palpitations, paroxysmal nocturnal dyspnea and syncope.   Respiratory:  Negative for cough, shortness of breath and sleep disturbances due to breathing.    Musculoskeletal:  Negative for falls.   Neurological:  Negative for dizziness and weakness.   Psychiatric/Behavioral:  Negative for altered mental status and substance abuse.        No Known Allergies      Current Outpatient Medications:     aspirin 325 MG tablet, Take 1 tablet by mouth Every Morning., Disp: , Rfl:     celecoxib (CeleBREX) 200 MG capsule, Take 1 capsule by mouth Daily. To call MD about when to stop prior to surgery, Disp: , Rfl:     Continuous Blood Gluc Sensor (Dexcom G6 Sensor), APPLY 1 SENSOR ON THE SKIN AND CHANGE EVERY 10 DAYS, Disp: , Rfl:     Continuous Blood Gluc Transmit (Dexcom G6 Transmitter) misc, USE AS INSTRUCTED/EDUCATED, Disp: , Rfl:     dapagliflozin Propanediol (Farxiga) 10 MG tablet, Take 10 mg by mouth Every Morning., Disp: , Rfl:     glucose blood test strip, 1 each by Other route., Disp: , Rfl:     insulin aspart (NovoLOG FlexPen) 100 UNIT/ML solution pen-injector sc pen, Inject 15-30 Units under the skin into the appropriate area as directed 3 (Three) Times a Day With Meals., Disp: , Rfl:     irbesartan (AVAPRO) 300 MG tablet, Take 1 tablet by mouth Every Morning., Disp: , Rfl:     metFORMIN ER (GLUCOPHAGE-XR) 500 MG 24 hr tablet, Take 4 tablets by mouth Daily With Breakfast., Disp: , Rfl:     omeprazole (priLOSEC) 40 MG capsule, Take 1 capsule by mouth Every Morning., Disp: , Rfl:     Ozempic, 1 MG/DOSE, 4 MG/3ML solution pen-injector, Inject 1 mg under the skin into the appropriate area as directed 1 (One) Time Per Week. friday, Disp: , Rfl:      ROSUVASTATIN CALCIUM PO, Take 40 mg by mouth Every Morning., Disp: , Rfl:     Tresiba FlexTouch 100 UNIT/ML solution pen-injector injection, Inject 28 Units under the skin into the appropriate area as directed Every Night., Disp: , Rfl:       Objective:     Vitals:    12/22/23 1122   BP: 128/83   Pulse: 101   Weight: 96.2 kg (212 lb)     Body mass index is 31.77 kg/m².    PHYSICAL EXAM:    Constitutional:       General: Not in acute distress.     Appearance: Normal appearance. Well-developed.   Eyes:      Pupils: Pupils are equal, round, and reactive to light.   HENT:      Head: Normocephalic.   Neck:      Vascular: No carotid bruit or JVD.   Pulmonary:      Effort: Pulmonary effort is normal. No tachypnea.      Breath sounds: Normal breath sounds. No wheezing. No rales.   Cardiovascular:      Normal rate. Regular rhythm.      No gallop.    Pulses:     Intact distal pulses.   Edema:     Peripheral edema absent.   Abdominal:      General: Bowel sounds are normal.      Palpations: Abdomen is soft.      Tenderness: There is no abdominal tenderness.   Musculoskeletal: Normal range of motion.      Cervical back: Normal range of motion and neck supple. No edema. Skin:     General: Skin is warm and dry.   Neurological:      Mental Status: Alert and oriented to person, place, and time.           ECG 12 Lead    Date/Time: 12/22/2023 11:25 AM  Performed by: Janis Angelo APRN    Authorized by: Janis Angelo APRN  Comparison: compared with previous ECG from 10/9/2023  Similar to previous ECG  Rhythm: sinus rhythm  Rate: normal  QRS axis: normal    Clinical impression: normal ECG            Assessment:       Diagnosis Plan   1. Primary hypertension     Blood pressure controlled on current regimen.   2. Hypercholesterolemia     Most recent lipid panel was actually little low would recommend reducing the rosuvastatin.  Will defer to primary   3. GURDEEP (obstructive sleep apnea)     Compliant with CPAP     Orders Placed  This Encounter   Procedures    ECG 12 Lead     This order was created via procedure documentation     Order Specific Question:   Release to patient     Answer:   Routine Release [[id number]]          Plan:       Follow-up in 1 year.         Your medication list            Accurate as of December 22, 2023 11:47 AM. If you have any questions, ask your nurse or doctor.                CONTINUE taking these medications        Instructions Last Dose Given Next Dose Due   aspirin 325 MG tablet      Take 1 tablet by mouth Every Morning.       celecoxib 200 MG capsule  Commonly known as: CeleBREX      Take 1 capsule by mouth Daily. To call MD about when to stop prior to surgery       Dexcom G6 Sensor      APPLY 1 SENSOR ON THE SKIN AND CHANGE EVERY 10 DAYS       Dexcom G6 Transmitter misc      USE AS INSTRUCTED/EDUCATED       Farxiga 10 MG tablet  Generic drug: dapagliflozin Propanediol      Take 10 mg by mouth Every Morning.       glucose blood test strip      1 each by Other route.       irbesartan 300 MG tablet  Commonly known as: AVAPRO      Take 1 tablet by mouth Every Morning.       metFORMIN  MG 24 hr tablet  Commonly known as: GLUCOPHAGE-XR      Take 4 tablets by mouth Daily With Breakfast.       NovoLOG FlexPen 100 UNIT/ML solution pen-injector sc pen  Generic drug: insulin aspart      Inject 15-30 Units under the skin into the appropriate area as directed 3 (Three) Times a Day With Meals.       omeprazole 40 MG capsule  Commonly known as: priLOSEC      Take 1 capsule by mouth Every Morning.       Ozempic (1 MG/DOSE) 4 MG/3ML solution pen-injector  Generic drug: Semaglutide (1 MG/DOSE)      Inject 1 mg under the skin into the appropriate area as directed 1 (One) Time Per Week. friday       ROSUVASTATIN CALCIUM PO      Take 40 mg by mouth Every Morning.       Tresiba FlexTouch 100 UNIT/ML solution pen-injector injection  Generic drug: insulin degludec      Inject 28 Units under the skin into the appropriate  area as directed Every Night.                  As always, it has been a pleasure to participate in your patient's care.      Sincerely,     Janis GARCIA

## 2023-12-26 ENCOUNTER — TELEPHONE (OUTPATIENT)
Dept: SURGERY | Facility: CLINIC | Age: 53
End: 2023-12-26
Payer: COMMERCIAL

## 2023-12-26 NOTE — TELEPHONE ENCOUNTER
Called patient to review recent ultrasound results.  Imaging consistent with fat necrosis and seroma.  Discussed the seroma should continue to resolve over time.  His surgery was 10/17/2023 and he is now 10 weeks out.  Discussed he should let comfort be his guide in regards to any activity restrictions.  Should he develop any erythema or warmth, he knows to contact the office.  Otherwise, he can follow-up as needed.    Stephy Allen PA-C

## 2023-12-28 ENCOUNTER — TELEPHONE (OUTPATIENT)
Dept: CARDIOLOGY | Facility: CLINIC | Age: 53
End: 2023-12-28

## 2023-12-28 NOTE — TELEPHONE ENCOUNTER
Caller: Jose Almazan    Relationship: Self    Best call back number: [phone]    What is the best time to reach you: ANY    Who are you requesting to speak with (clinical staff, provider,  specific staff member): ANY    Do you know the name of the person who called: CRIS    What was the call regarding: PATIENT STATED THAT CRIS LEFT VOICE MAIL ASKING PATIENT TO CALL HER BACK TO SCHEDULE AN APPOINTMENT WITH DR. VASQUEZ. PLEASE CONTACT PATIENT TO SCHEDULE. THANK YOU.     Is it okay if the provider responds through [employer]: PLEASE CALL.

## 2024-07-27 ENCOUNTER — HOSPITAL ENCOUNTER (EMERGENCY)
Facility: HOSPITAL | Age: 54
Discharge: HOME | End: 2024-07-27
Attending: EMERGENCY MEDICINE
Payer: COMMERCIAL

## 2024-07-27 VITALS
WEIGHT: 208.6 LBS | DIASTOLIC BLOOD PRESSURE: 74 MMHG | OXYGEN SATURATION: 94 % | TEMPERATURE: 98.3 F | RESPIRATION RATE: 18 BRPM | SYSTOLIC BLOOD PRESSURE: 113 MMHG | HEIGHT: 69 IN | HEART RATE: 89 BPM | BODY MASS INDEX: 30.9 KG/M2

## 2024-07-27 DIAGNOSIS — E16.0 HYPOGLYCEMIA DUE TO INSULIN: Primary | ICD-10-CM

## 2024-07-27 DIAGNOSIS — T38.3X5A HYPOGLYCEMIA DUE TO INSULIN: Primary | ICD-10-CM

## 2024-07-27 LAB
GLUCOSE BLDC GLUCOMTR-MCNC: 105 MG/DL (ref 70–130)
GLUCOSE BLDC GLUCOMTR-MCNC: 116 MG/DL (ref 70–130)
GLUCOSE BLDC GLUCOMTR-MCNC: 116 MG/DL (ref 70–130)
GLUCOSE BLDC GLUCOMTR-MCNC: 82 MG/DL (ref 70–130)
GLUCOSE BLDC GLUCOMTR-MCNC: 92 MG/DL (ref 70–130)
GLUCOSE BLDC GLUCOMTR-MCNC: 93 MG/DL (ref 70–130)

## 2024-07-27 PROCEDURE — 99282 EMERGENCY DEPT VISIT SF MDM: CPT

## 2024-07-27 PROCEDURE — 82948 REAGENT STRIP/BLOOD GLUCOSE: CPT

## 2024-07-27 PROCEDURE — 99284 EMERGENCY DEPT VISIT MOD MDM: CPT | Performed by: EMERGENCY MEDICINE

## 2024-07-27 NOTE — FSED PROVIDER NOTE
Subjective   History of Present Illness  Patient took his wrong insulin when he sheila up he took his daytime NovoLog instead of his Tresiba.  The patient's blood sugar by his sensor is telling him 130 we got 116.  At this point we will not react to it we will measure it in the next half hour and try to follow closely and react to any hypoglycemia.  The patient had already eaten some food prior to coming in.  Patient had cookies and milk prior to coming in.  Patient has no symptoms.      Review of Systems   All other systems reviewed and are negative.      Past Medical History:   Diagnosis Date    Arthritis     Asthma     Cholelithiasis 2007    Gall bladder removed    COVID 09/2022    Diabetes mellitus     Fissure, anal 2018    Repaired    GERD (gastroesophageal reflux disease)     Hyperlipidemia     Hypertension     Kidney stones     Morbid obesity     has lost 56 lb since jan 2023    GURDEEP on CPAP     Skin abrasion     multiple  on hands and arms    Ventral hernia     still present  has had 3 other hernia surgeries       No Known Allergies    Past Surgical History:   Procedure Laterality Date    ABSCESS DRAINAGE N/A     rectal abscess    CARDIAC CATHETERIZATION N/A 03/18/2019    Procedure: Left Heart Cath;  Surgeon: Nikolai Bolanos MD;  Location: [facility] CATH INVASIVE LOCATION;  Service: Cardiology    CHOLECYSTECTOMY  2008    COLONOSCOPY      COLONOSCOPY W/ POLYPECTOMY N/A 10/19/2022    Procedure: COLONOSCOPY TO CECUM;  Surgeon: Gasper Xie MD;  Location: [facility] ENDOSCOPY;  Service: Gastroenterology;  Laterality: N/A;  PREOP/ CONSTIPATION  POSTOP/ NORMAL    ENDOSCOPY N/A 10/19/2022    Procedure: ESOPHAGOGASTRODUODENOSCOPY WITH BIOPSY;  Surgeon: Gasper Xie MD;  Location: [facility] ENDOSCOPY;  Service: Gastroenterology;  Laterality: N/A;  PREOP/ HEARTBURN  POSTOP/ GASTRITIS    EYE SURGERY  2005    LASIK surgery    HERNIA MESH REMOVAL N/A 12/20/2018    removal of infected mesh & hernia repair     INCISIONAL HERNIA REPAIR N/A 2016    LAPAROSCOPIC REPAIR OF INCARCERATED INCISIONAL HERNIA CAUSING OBSTRUCTION WITH DUALMESH PLUS    INCISIONAL HERNIA REPAIR N/A 2019    KIDNEY STONE SURGERY      LASIK Bilateral 2006    TONSILLECTOMY Bilateral     VENTRAL/INCISIONAL HERNIA REPAIR N/A 10/17/2023    Procedure: OPEN RECURRENT INCISIONAL HERNIA REPAIR WITH MESH;  Surgeon: Hamzah Perea MD;  Location: [facility] MAIN OR;  Service: General;  Laterality: N/A;       Family History   Problem Relation Age of Onset    Heart disease Mother         Mother  from MI and perforated coronary artery during cath    Heart disease Father         Father with 4 vessel CABG in his late 40's.  Also with 2 MI's.    Diabetes Father     Down syndrome Sister     Heart disease Sister     Coronary artery disease Paternal Grandfather         Paternal GF with 3 MI's    Malig Hyperthermia Neg Hx        Social History     Socioeconomic History    Marital status:    Tobacco Use    Smoking status: Never    Smokeless tobacco: Never    Tobacco comments:     caffeine use- diet coke, tea   Vaping Use    Vaping status: Never Used   Substance and Sexual Activity    Alcohol use: Yes     Alcohol/week: 6.0 standard drinks of alcohol     Types: 6 Cans of beer per week     Comment: Per month    Drug use: Never    Sexual activity: Not Currently     Partners: Female     Birth control/protection: Same-sex partner           Objective   Physical Exam  Vitals and nursing note reviewed.   Constitutional:       General: He is not in acute distress.     Appearance: Normal appearance. He is not ill-appearing, toxic-appearing or diaphoretic.   HENT:      Head: Normocephalic and atraumatic.      Nose: Nose normal.      Mouth/Throat:      Mouth: Mucous membranes are moist. Mucous membranes are dry.   Eyes:      Extraocular Movements: Extraocular movements intact.   Cardiovascular:      Rate and Rhythm: Normal rate and regular rhythm.      Pulses:  Normal pulses.      Heart sounds: Normal heart sounds.   Pulmonary:      Effort: Pulmonary effort is normal. No respiratory distress.      Breath sounds: Normal breath sounds. No stridor.   Abdominal:      General: Bowel sounds are normal.      Palpations: Abdomen is soft.   Musculoskeletal:      Cervical back: Normal range of motion.   Skin:     General: Skin is warm and dry.      Capillary Refill: Capillary refill takes less than 2 seconds.   Neurological:      Mental Status: He is alert and oriented to person, place, and time.         Procedures           ED Course                                           Medical Decision Making  Patient maintain this last 2 hours.  He has not eaten in the past hour the sugar at 3:00 at 3 AM was over 100 he is gone up from 80-90 200.  If he maintains at 4 AM he will be discharged stable condition the drugs half-life it 82 minutes will run its course at 5 at that for he is not dropped and that he is over 100 he can be discharged.  The patient however at 3 AM had a sugar that did go down below 100 therefore the patient was given MAC and cheese are the only carbohydrate that we had for a longer term and the patient is having trouble staying awake being up all night thus far.  He will be 5 hours out at 4 AM and he was just fed now at 4:20 AM will wait again until 5 to make sure that he is awake enough and his sugars not dropping any further that he can go home.  Patient had his mac & cheese and was comfortable going home and he was released at 4:30 AM.  His Humalog could have had 4 and the half-life times at this point and he can be safely discharged.    Problems Addressed:  Hypoglycemia due to insulin: complicated acute illness or injury    Amount and/or Complexity of Data Reviewed  Labs: ordered.        Final diagnoses:   Hypoglycemia due to insulin       ED Disposition  ED Disposition       ED Disposition   Discharge    Condition   Stable    Comment   --               Dorian  Real PEREZ MD  [address] [place] [address] [phone]      As needed         Medication List      No changes were made to your prescriptions during this visit.

## 2024-07-27 NOTE — DISCHARGE INSTRUCTIONS
Be very careful when taking your insulin that you check your bottle.  Consider eating a snack when you get home.

## 2024-12-02 ENCOUNTER — OFFICE VISIT (OUTPATIENT)
Dept: CARDIOLOGY | Facility: CLINIC | Age: 54
End: 2024-12-02
Payer: COMMERCIAL

## 2024-12-02 VITALS
OXYGEN SATURATION: 98 % | WEIGHT: 209 LBS | RESPIRATION RATE: 16 BRPM | DIASTOLIC BLOOD PRESSURE: 80 MMHG | SYSTOLIC BLOOD PRESSURE: 140 MMHG | HEIGHT: 69 IN | BODY MASS INDEX: 30.96 KG/M2 | HEART RATE: 79 BPM

## 2024-12-02 DIAGNOSIS — I25.10 CORONARY ARTERIOSCLEROSIS: ICD-10-CM

## 2024-12-02 DIAGNOSIS — I10 PRIMARY HYPERTENSION: ICD-10-CM

## 2024-12-02 DIAGNOSIS — E78.00 HYPERCHOLESTEROLEMIA: Primary | ICD-10-CM

## 2024-12-02 PROCEDURE — 99214 OFFICE O/P EST MOD 30 MIN: CPT | Performed by: INTERNAL MEDICINE

## 2024-12-02 PROCEDURE — 93000 ELECTROCARDIOGRAM COMPLETE: CPT | Performed by: INTERNAL MEDICINE

## 2024-12-02 NOTE — PROGRESS NOTES
Subjective:     Encounter Date:12/02/24      Patient ID: Jose Almazan is a 54 y.o. male.    Chief Complaint:  History of Present Illness    Dear Dr. Alarcon,    I had the pleasure of seeing this patient in the office today for follow-up of his cardiac status.    Patient has a history of coronary arteriosclerosis without hemodynamically significant stenosis.  He has a history of hypertension, hyperlipidemia, and type 2 diabetes mellitus.    This is the first time I seen him; he is seen Dr. Dalton in the Dr. Serrato in the past.  He was in the office about a year ago.    He denies any chest pain, pressure, tightness, squeezing, or heartburn.  He has not experienced any feeling of palpitations, tachycardia or heart racing and no presyncope or syncope.  There has not been any problems with dizziness or lightheadedness.  There has not been any orthopnea or PND, and no problems with lower extremity edema.  He denies any shortness of breath at rest or with activity and has not had any wheezing.  He has not had any problems with unexplained nausea or vomiting. He has continued to perform daily activities of living without any specific problem or change in the level of activity.  He has not been recently hospitalized for any reason.    The following portions of the patient's history were reviewed and updated as appropriate: allergies, current medications, past family history, past medical history, past social history, past surgical history and problem list.      ECG 12 Lead    Date/Time: 12/2/2024 2:33 PM  Performed by: Daljit Gonzalez III, MD    Authorized by: Daljit Gonzalez III, MD  Comparison: compared with previous ECG   Comparison to previous ECG: Nonspecific T wave changes somewhat more prominent  Rhythm: sinus rhythm  Rate: normal  Conduction: conduction normal  QRS axis: normal  Other findings: non-specific ST-T wave changes    Clinical impression: non-specific ECG             Objective:     Vitals:     "12/02/24 1356   BP: 140/80   Pulse: 79   Resp: 16   SpO2: 98%   Weight: 94.8 kg (209 lb)   Height: 175.3 cm (69\")     Body mass index is 30.86 kg/m².      Vitals reviewed.   Constitutional:       General: Not in acute distress.     Appearance: Well-developed. Not diaphoretic.   Eyes:      General:         Right eye: No discharge.         Left eye: No discharge.      Conjunctiva/sclera: Conjunctivae normal.   HENT:      Head: Normocephalic and atraumatic.      Nose: Nose normal.   Neck:      Thyroid: No thyromegaly.      Trachea: No tracheal deviation.   Pulmonary:      Effort: Pulmonary effort is normal. No respiratory distress.      Breath sounds: Normal breath sounds. No stridor.   Chest:      Chest wall: Not tender to palpatation.   Cardiovascular:      Normal rate. Regular rhythm.      Murmurs: There is no murmur.      . No S3 gallop. No click. No rub.   Pulses:     Intact distal pulses.   Edema:     Peripheral edema absent.   Abdominal:      General: Bowel sounds are normal. There is no distension.      Palpations: Abdomen is soft. There is no abdominal mass.   Musculoskeletal: Normal range of motion.         General: No tenderness or deformity.      Cervical back: Normal range of motion and neck supple. Skin:     General: Skin is warm and dry.      Findings: No erythema or rash.   Neurological:      Mental Status: Alert.   Psychiatric:         Attention and Perception: Attention normal.         Data and records reviewed:     Lab Results   Component Value Date    GLUCOSE 119 (H) 10/19/2023    BUN 21 (H) 10/19/2023    CREATININE 0.87 10/19/2023     10/19/2023    K 4.4 10/19/2023     10/19/2023    CALCIUM 8.7 10/19/2023    PROTEINTOT 6.2 (L) 09/29/2022    ALBUMIN 4.4 09/29/2022    ALT 26 09/29/2022    AST 26 09/29/2022    ALKPHOS 78 09/29/2022    BILITOT 0.4 09/29/2022    GLOB 1.8 09/29/2022    AGRATIO 1.4 06/02/2022    BCR 24.1 10/19/2023    ANIONGAP 9.0 10/19/2023    EGFR 102.5 10/19/2023     Lab " Results   Component Value Date    CHOL 123 06/02/2022    CHOL 256 (H) 11/30/2016     Lab Results   Component Value Date    TRIG 167 (H) 06/02/2022    TRIG 107 09/14/2020    TRIG 120 03/04/2020     Lab Results   Component Value Date    HDL 39 (L) 06/02/2022    HDL 40 09/14/2020    HDL 41 03/04/2020     Lab Results   Component Value Date    LDL 56 06/02/2022    LDL 53 09/14/2020    LDL 56 03/04/2020     Lab Results   Component Value Date    VLDL 28 06/02/2022    VLDL 21 09/14/2020    VLDL 24 03/04/2020     Lab Results   Component Value Date    LDLHDL 1.30 06/02/2022    LDLHDL 2.75 11/30/2016     CBC          12/21/2023    10:34 4/25/2024    10:11   CBC   WBC 6.73     6.02       RBC 4.62     4.11       Hemoglobin 14.2     12.8       Hematocrit 43.4     38.9       MCV 93.9     94.6       MCH 30.7     31.1       MCHC 32.7     32.9       RDW 11.9     12.5       Platelets 243     206          Details          This result is from an external source.             No radiology results for the last 90 days.  Results for orders placed during the hospital encounter of 12/24/20    Adult Stress Echo W/ Cont or Stress Agent if Necessary Per Protocol    Interpretation Summary  · Normal stress echo with no significant echocardiographic evidence for myocardial ischemia consistent with a low risk study for myocardial ischemia.  · Calculated left ventricular EF = 63%  · Left ventricular wall thickness is consistent with mild concentric hypertrophy.  · Left ventricular diastolic function is consistent with (grade I) impaired relaxation.  · Normal right ventricular cavity size and systolic function noted.  · The left atrial cavity is borderline dilated.  · Mild mitral valve regurgitation is present.  · There is no evidence of pericardial effusion.          Assessment:          Diagnosis Plan   1. Hypercholesterolemia        2. Primary hypertension        3. Coronary arteriosclerosis               Plan:       1.  Coronary care sclerosis-we  discussed risk factor modification  2.  Hypertension-good control, continue same, creatinine, BUN reviewed  3.  Mixed hyperlipidemia on lipid-lowering therapy  4.  Obstructive sleep apnea on CPAP    Thank you very much for allowing us to participate in the care of this pleasant patient.  Please don't hesitate to call if I can be of assistance in any way.      Current Outpatient Medications:     aspirin 325 MG tablet, Take 1 tablet by mouth Every Morning., Disp: , Rfl:     celecoxib (CeleBREX) 200 MG capsule, Take 1 capsule by mouth Daily. To call MD about when to stop prior to surgery, Disp: , Rfl:     Continuous Blood Gluc Sensor (Dexcom G6 Sensor), APPLY 1 SENSOR ON THE SKIN AND CHANGE EVERY 10 DAYS, Disp: , Rfl:     Continuous Blood Gluc Transmit (Dexcom G6 Transmitter) misc, USE AS INSTRUCTED/EDUCATED, Disp: , Rfl:     dapagliflozin Propanediol (Farxiga) 10 MG tablet, Take 10 mg by mouth Every Morning., Disp: , Rfl:     glucose blood test strip, 1 each by Other route., Disp: , Rfl:     insulin aspart (NovoLOG FlexPen) 100 UNIT/ML solution pen-injector sc pen, Inject 15-30 Units under the skin into the appropriate area as directed 3 (Three) Times a Day With Meals., Disp: , Rfl:     irbesartan (AVAPRO) 300 MG tablet, Take 1 tablet by mouth Every Morning., Disp: , Rfl:     metFORMIN ER (GLUCOPHAGE-XR) 500 MG 24 hr tablet, Take 4 tablets by mouth Daily With Breakfast., Disp: , Rfl:     omeprazole (priLOSEC) 40 MG capsule, Take 1 capsule by mouth Every Morning., Disp: , Rfl:     Ozempic, 1 MG/DOSE, 4 MG/3ML solution pen-injector, Inject 1 mg under the skin into the appropriate area as directed 1 (One) Time Per Week. friday, Disp: , Rfl:     ROSUVASTATIN CALCIUM PO, Take 40 mg by mouth Every Morning., Disp: , Rfl:     Tresiba FlexTouch 100 UNIT/ML solution pen-injector injection, Inject 28 Units under the skin into the appropriate area as directed Every Night., Disp: , Rfl:          Return in about 1 year (around  12/2/2025).

## 2024-12-05 ENCOUNTER — OFFICE VISIT (OUTPATIENT)
Dept: SURGERY | Facility: CLINIC | Age: 54
End: 2024-12-05
Payer: COMMERCIAL

## 2024-12-05 VITALS
OXYGEN SATURATION: 95 % | DIASTOLIC BLOOD PRESSURE: 86 MMHG | HEIGHT: 69 IN | WEIGHT: 206.8 LBS | HEART RATE: 77 BPM | SYSTOLIC BLOOD PRESSURE: 132 MMHG | BODY MASS INDEX: 30.63 KG/M2

## 2024-12-05 DIAGNOSIS — R19.8 ABDOMINAL WALL ASYMMETRY: Primary | ICD-10-CM

## 2024-12-05 PROCEDURE — 99213 OFFICE O/P EST LOW 20 MIN: CPT | Performed by: PHYSICIAN ASSISTANT

## 2024-12-06 NOTE — PROGRESS NOTES
ASSESSMENT/PLAN:    54-year-old gentleman presenting with concerns for abdominal asymmetry on the right.  Dr. Perea and I did a thorough exam on him and do not feel any recurrent herniation nor obvious laxity.  He does have visual asymmetry which may represent fatty deposition.  To better ascertain the integrity of his abdominal wall and any potential defects we will proceed with a CT scan of the abdomen and pelvis.  Once the results of been reviewed we will discuss whether or not any additional follow-up is necessary.  I did encourage him to only use the abdominal binder when he is doing strenuous or exertional activity but aside from that when he is up and about or out and about not doing very strenuous work he should not use it in hopes of strengthening his core muscles.  All questions were answered and he was willing to proceed with all recommendations.    CC:     Abdominal wall bulge    HPI:    This is a 54-year-old gentleman returning to the office today after last having seen Dr. Perea following his open recurrent incisional hernia repair with mesh in October 2023.  He has a history significant for having had 4 abdominal hernia operations for recurrent abdominal herniations.  Over the last several weeks to months he has noticed what he believes to be asymmetry on the right side of his transverse incision, concerning for a possible recurrence of his herniation.  He has no pain associated with this nor does he have any nausea or vomiting.  He does admit that he uses his abdominal binder at all times except for when he is laying around the house or asleep.    ENDOSCOPY:   Colonoscopy 2022 normal  EGD 2022 gastritis    SOCIAL HISTORY:   Denies tobacco use  Occasional alcohol use    FAMILY HISTORY:    Colorectal cancer: None    PREVIOUS ABDOMINAL SURGERY    Cholecystectomy 2008  Laparoscopic repair of incarcerated incisional hernia 2016  Removal of infected mesh and hernia repair 2018  Incisional hernia repair  2019  Open recurrent incisional hernia repair with mesh 2023    OTHER SURGERY  Past Surgical History:   Procedure Laterality Date    ABSCESS DRAINAGE N/A     rectal abscess    CARDIAC CATHETERIZATION N/A 03/18/2019    Procedure: Left Heart Cath;  Surgeon: Nikolai Bolanos MD;  Location: [facility] CATH INVASIVE LOCATION;  Service: Cardiology    CHOLECYSTECTOMY  2008    COLONOSCOPY      COLONOSCOPY W/ POLYPECTOMY N/A 10/19/2022    Procedure: COLONOSCOPY TO CECUM;  Surgeon: Gasper Xie MD;  [facility];  Service: Gastroenterology;  Laterality: N/A;  PREOP/ CONSTIPATION  POSTOP/ NORMAL    ENDOSCOPY N/A 10/19/2022    Procedure: ESOPHAGOGASTRODUODENOSCOPY WITH BIOPSY;  Surgeon: Gasper Xie MD;  Location: [facility] ENDOSCOPY;  Service: Gastroenterology;  Laterality: N/A;  PREOP/ HEARTBURN  POSTOP/ GASTRITIS    EYE SURGERY  2005    LASIK surgery    HERNIA MESH REMOVAL N/A 12/20/2018    removal of infected mesh & hernia repair    INCISIONAL HERNIA REPAIR N/A 08/29/2016    LAPAROSCOPIC REPAIR OF INCARCERATED INCISIONAL HERNIA CAUSING OBSTRUCTION WITH DUALMESH PLUS    INCISIONAL HERNIA REPAIR N/A 08/12/2019    KIDNEY STONE SURGERY      LASIK Bilateral 2006    TONSILLECTOMY Bilateral     VENTRAL/INCISIONAL HERNIA REPAIR N/A 10/17/2023    Procedure: OPEN RECURRENT INCISIONAL HERNIA REPAIR WITH MESH;  Surgeon: Hamazh Perea MD;  Location: [facility] MAIN OR;  Service: General;  Laterality: N/A;       PAST MEDICAL HISTORY:    Past Medical History:   Diagnosis Date    Arthritis     Asthma     Cholelithiasis 2007    Gall bladder removed    COVID 09/2022    Diabetes mellitus     Fissure, anal 2018    Repaired    GERD (gastroesophageal reflux disease)     Hyperlipidemia     Hypertension     Kidney stones     Morbid obesity     has lost 56 lb since jan 2023    GURDEEP on CPAP     Skin abrasion     multiple  on hands and arms    Ventral hernia     still present  has had 3 other hernia surgeries  "      MEDICATIONS:     Current Outpatient Medications:     aspirin 325 MG tablet, Take 1 tablet by mouth Every Morning., Disp: , Rfl:     B Complex Vitamins (B COMPLEX-B12 PO), Take  by mouth., Disp: , Rfl:     celecoxib (CeleBREX) 200 MG capsule, Take 1 capsule by mouth Daily. To call MD about when to stop prior to surgery, Disp: , Rfl:     Continuous Blood Gluc Sensor (Dexcom G6 Sensor), APPLY 1 SENSOR ON THE SKIN AND CHANGE EVERY 10 DAYS, Disp: , Rfl:     Continuous Blood Gluc Transmit (Dexcom G6 Transmitter) misc, USE AS INSTRUCTED/EDUCATED, Disp: , Rfl:     dapagliflozin Propanediol (Farxiga) 10 MG tablet, Take 10 mg by mouth Every Morning., Disp: , Rfl:     glucose blood test strip, 1 each by Other route., Disp: , Rfl:     insulin aspart (NovoLOG FlexPen) 100 UNIT/ML solution pen-injector sc pen, Inject 15-30 Units under the skin into the appropriate area as directed 3 (Three) Times a Day With Meals., Disp: , Rfl:     irbesartan (AVAPRO) 300 MG tablet, Take 1 tablet by mouth Every Morning., Disp: , Rfl:     metFORMIN ER (GLUCOPHAGE-XR) 500 MG 24 hr tablet, Take 4 tablets by mouth Daily With Breakfast., Disp: , Rfl:     omeprazole (priLOSEC) 40 MG capsule, Take 1 capsule by mouth Every Morning., Disp: , Rfl:     Ozempic, 1 MG/DOSE, 4 MG/3ML solution pen-injector, Inject 1 mg under the skin into the appropriate area as directed 1 (One) Time Per Week. friday, Disp: , Rfl:     ROSUVASTATIN CALCIUM PO, Take 40 mg by mouth Every Morning., Disp: , Rfl:     Tresiba FlexTouch 100 UNIT/ML solution pen-injector injection, Inject 28 Units under the skin into the appropriate area as directed Every Night., Disp: , Rfl:     ALLERGIES:   No Known Allergies    PHYSICAL EXAM:   Constitutional: Well-developed well-nourished, no acute distress  Vital signs:   Height 69\"  Weight 206  BMI 30.5  Neck: Supple, no palpable mass, trachea midline  Respiratory: Clear to auscultation, normal inspiratory effort  Cardiovascular: Regular " rate, no murmur, no carotid bruit  Gastrointestinal: Well-healed transverse incision at the level of the umbilicus with fatty asymmetry greater on the right than left, no palpable masses, no recurrent hernia, no obvious laxity, remainder of abdomen is soft, nontender  Psychiatric: Alert and oriented ×3, normal affect   BMI is >= 30 and <35. (Class 1 Obesity). The following options were offered after discussion;: weight loss educational material (shared in after visit summary)      Cyrus Demarco PA-C    [facility] - General Surgery  [address], [address]  [place] [address]    [address], [address]  [place] [address]    Office: [phone]  Fax: [phone]

## 2024-12-07 NOTE — PROGRESS NOTES
I have reviewed the notes, assessments, and/or procedures performed by Cyrus Demarco, I concur with her/his documentation of Jose Almazan.

## 2024-12-18 ENCOUNTER — HOSPITAL ENCOUNTER (OUTPATIENT)
Dept: CT IMAGING | Facility: HOSPITAL | Age: 54
Discharge: HOME | End: 2024-12-18
Admitting: PHYSICIAN ASSISTANT
Payer: COMMERCIAL

## 2024-12-18 LAB — CREAT BLDA-MCNC: 1.7 MG/DL (ref 0.6–1.3)

## 2024-12-18 PROCEDURE — 74177 CT ABD & PELVIS W/CONTRAST: CPT

## 2024-12-18 PROCEDURE — 25510000001 IOPAMIDOL 61 % SOLUTION: Performed by: PHYSICIAN ASSISTANT

## 2024-12-18 PROCEDURE — 82565 ASSAY OF CREATININE: CPT

## 2024-12-18 RX ORDER — IOPAMIDOL 612 MG/ML
100 INJECTION, SOLUTION INTRAVASCULAR
Status: COMPLETED | OUTPATIENT
Start: 2024-12-18 | End: 2024-12-18

## 2024-12-18 RX ADMIN — IOPAMIDOL 85 ML: 612 INJECTION, SOLUTION INTRAVENOUS at 16:11

## 2024-12-30 ENCOUNTER — TELEPHONE (OUTPATIENT)
Dept: SURGERY | Facility: CLINIC | Age: 54
End: 2024-12-30
Payer: COMMERCIAL

## 2024-12-30 NOTE — TELEPHONE ENCOUNTER
----- Message from Cyrus Demarco sent at 12/30/2024  1:32 PM EST -----  Please let him know that his CT does not show a hernia or any other abnormality of his abdominal wall

## (undated) DEVICE — VIOLET BRAIDED (POLYGLACTIN 910), SYNTHETIC ABSORBABLE SUTURE: Brand: COATED VICRYL

## (undated) DEVICE — SUT VIC 3/0 TIES 18IN J110T

## (undated) DEVICE — SENSR O2 OXIMAX FNGR A/ 18IN NONSTR

## (undated) DEVICE — GW EMR FIX EXCHG J STD .035 3MM 260CM

## (undated) DEVICE — CATH DIAG IMPULSE PIG 5F 100CM

## (undated) DEVICE — KT MANIFLD CARDIAC

## (undated) DEVICE — LN SMPL CO2 SHTRM SD STREAM W/M LUER

## (undated) DEVICE — GLIDESHEATH BASIC HYDROPHILIC COATED INTRODUCER SHEATH: Brand: GLIDESHEATH

## (undated) DEVICE — CATH DIAG CARD PERFORM JR4.0 BT 4F100CM

## (undated) DEVICE — FRCP BX RADJAW4 NDL 2.8 240CM LG OG BX40

## (undated) DEVICE — BITEBLOCK OMNI BLOC

## (undated) DEVICE — SUT NUROLON 0 CT1 CR8 18IN C521D

## (undated) DEVICE — DRSNG WND BORDR/ADHS NONADHR/GZ LF 4X4IN STRL

## (undated) DEVICE — RADIFOCUS GLIDEWIRE: Brand: GLIDEWIRE

## (undated) DEVICE — CATH VENT MIV RADL PIG ST TIP 4F 110CM

## (undated) DEVICE — GOWN ,SIRUS,NONREINFORCED SMALL: Brand: MEDLINE

## (undated) DEVICE — PK PROC MAJ 40

## (undated) DEVICE — TRAP FLD MINIVAC MEGADYNE 100ML

## (undated) DEVICE — CANN O2 ETCO2 FITS ALL CONN CO2 SMPL A/ 7IN DISP LF

## (undated) DEVICE — CATH DIAG IMPULSE FL3.5 5F 100CM

## (undated) DEVICE — SKIN PREP TRAY W/CHG: Brand: MEDLINE INDUSTRIES, INC.

## (undated) DEVICE — PATIENT RETURN ELECTRODE, SINGLE-USE, CONTACT QUALITY MONITORING, ADULT, WITH 9FT CORD, FOR PATIENTS WEIGING OVER 33LBS. (15KG): Brand: MEGADYNE

## (undated) DEVICE — GLV SURG PREMIERPRO ORTHO LTX PF SZ7.5 BRN

## (undated) DEVICE — GLV SURG BIOGEL LTX PF 6

## (undated) DEVICE — CATH DIAG IMPULSE FR4 5F 100CM

## (undated) DEVICE — ELECTRD BLD EZ CLN MOD XLNG 2.75IN

## (undated) DEVICE — PROXIMATE RH ROTATING HEAD SKIN STAPLERS (35 REGULAR) CONTAINS 35 STAINLESS STEEL STAPLES: Brand: PROXIMATE

## (undated) DEVICE — Device

## (undated) DEVICE — ADAPT CLN BIOGUARD AIR/H2O DISP

## (undated) DEVICE — NDL HYPO ECLPS SFTY 22G 1 1/2IN

## (undated) DEVICE — TBG PENCL TELESCP MEGADYNE SMOKE EVAC 10FT

## (undated) DEVICE — PK CATH CARD 40

## (undated) DEVICE — LARGE VISCERA RETAINER: Brand: VISCERA RETAINER, FISH

## (undated) DEVICE — KT ORCA ORCAPOD DISP STRL

## (undated) DEVICE — TUBING, SUCTION, 1/4" X 10', STRAIGHT: Brand: MEDLINE

## (undated) DEVICE — JACKSON-PRATT 100CC BULB RESERVOIR: Brand: CARDINAL HEALTH